# Patient Record
Sex: MALE | Race: BLACK OR AFRICAN AMERICAN | Employment: FULL TIME | URBAN - METROPOLITAN AREA
[De-identification: names, ages, dates, MRNs, and addresses within clinical notes are randomized per-mention and may not be internally consistent; named-entity substitution may affect disease eponyms.]

---

## 2017-03-21 ENCOUNTER — APPOINTMENT (OUTPATIENT)
Dept: LAB | Age: 47
End: 2017-03-21
Attending: FAMILY MEDICINE
Payer: COMMERCIAL

## 2017-03-21 ENCOUNTER — OFFICE VISIT (OUTPATIENT)
Dept: FAMILY MEDICINE CLINIC | Facility: CLINIC | Age: 47
End: 2017-03-21

## 2017-03-21 VITALS
WEIGHT: 186 LBS | DIASTOLIC BLOOD PRESSURE: 98 MMHG | HEART RATE: 59 BPM | HEIGHT: 72 IN | TEMPERATURE: 98 F | RESPIRATION RATE: 16 BRPM | SYSTOLIC BLOOD PRESSURE: 122 MMHG | BODY MASS INDEX: 25.19 KG/M2

## 2017-03-21 DIAGNOSIS — E78.5 HYPERLIPIDEMIA, UNSPECIFIED HYPERLIPIDEMIA TYPE: Primary | ICD-10-CM

## 2017-03-21 DIAGNOSIS — E78.5 HYPERLIPIDEMIA, UNSPECIFIED HYPERLIPIDEMIA TYPE: ICD-10-CM

## 2017-03-21 LAB
CHOLEST SERPL-MCNC: 239 MG/DL (ref 110–200)
HDLC SERPL-MCNC: 49 MG/DL
LDLC SERPL CALC-MCNC: 177 MG/DL (ref 0–99)
NONHDLC SERPL-MCNC: 190 MG/DL
TRIGL SERPL-MCNC: 65 MG/DL (ref 1–149)

## 2017-03-21 PROCEDURE — 99214 OFFICE O/P EST MOD 30 MIN: CPT | Performed by: FAMILY MEDICINE

## 2017-03-21 PROCEDURE — 36415 COLL VENOUS BLD VENIPUNCTURE: CPT

## 2017-03-21 PROCEDURE — 80061 LIPID PANEL: CPT

## 2017-03-21 PROCEDURE — 99212 OFFICE O/P EST SF 10 MIN: CPT | Performed by: FAMILY MEDICINE

## 2017-03-21 NOTE — PROGRESS NOTES
Fatoumata Fair is a 41year old male who presents for routine physical. 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 He says he has made some improvements in diet. Patient sleeps really well. Patient only takes vitamins.  Last lipid panel and blood glucose was bor ear canals clear. Yves TMs intact with good landmarks noted. Nares patent. Oral mucous membrane moist.  Normal lips, teeth, and gums. Oropharynx normal.  Neck supple. Good ROM. No LAD.   Thyroid normal.  Lungs:  Clear to ausculation; good aeration

## 2017-03-21 NOTE — PATIENT INSTRUCTIONS
Monitor blood pressures and record at home. Limit salt intake. Consider initiating blood pressure therapy. Lipid recheck. Recommend weight loss via daily exercising and consistent healthy dietary changes.

## 2017-05-01 ENCOUNTER — OFFICE VISIT (OUTPATIENT)
Dept: FAMILY MEDICINE CLINIC | Facility: CLINIC | Age: 47
End: 2017-05-01

## 2017-05-01 VITALS
HEIGHT: 72 IN | SYSTOLIC BLOOD PRESSURE: 148 MMHG | HEART RATE: 82 BPM | DIASTOLIC BLOOD PRESSURE: 94 MMHG | RESPIRATION RATE: 16 BRPM | BODY MASS INDEX: 25.19 KG/M2 | WEIGHT: 186 LBS | TEMPERATURE: 99 F

## 2017-05-01 DIAGNOSIS — Z80.0 FAMILY HISTORY OF COLON CANCER: ICD-10-CM

## 2017-05-01 DIAGNOSIS — I10 ESSENTIAL HYPERTENSION: ICD-10-CM

## 2017-05-01 DIAGNOSIS — E78.5 HYPERLIPIDEMIA, UNSPECIFIED HYPERLIPIDEMIA TYPE: Primary | ICD-10-CM

## 2017-05-01 PROCEDURE — 99214 OFFICE O/P EST MOD 30 MIN: CPT | Performed by: FAMILY MEDICINE

## 2017-05-01 PROCEDURE — 99212 OFFICE O/P EST SF 10 MIN: CPT | Performed by: FAMILY MEDICINE

## 2017-05-01 NOTE — PATIENT INSTRUCTIONS
Monitor blood pressures and record at home. Limit salt intake. Recommend daily exercising and consistent healthy dietary changes. Colonoscopy recommended secondary to 1st degree family member with colon CA (father).

## 2017-05-01 NOTE — PROGRESS NOTES
HPI:    Patient ID: Inga Goins is a 41year old male. Blood Pressure  This is a chronic problem. The current episode started more than 1 month ago. The problem occurs intermittently. The problem has been waxing and waning.  Pertinent negatives include Normal rate and regular rhythm. Exam reveals no gallop. Edema not present. Carotid bruit not present. Pulmonary/Chest: Effort normal and breath sounds normal. No respiratory distress. ASSESSMENT/PLAN:   1.  Hyperlipidemia, unspecified hype

## 2017-06-29 ENCOUNTER — OFFICE VISIT (OUTPATIENT)
Dept: GASTROENTEROLOGY | Facility: CLINIC | Age: 47
End: 2017-06-29

## 2017-06-29 ENCOUNTER — TELEPHONE (OUTPATIENT)
Dept: GASTROENTEROLOGY | Facility: CLINIC | Age: 47
End: 2017-06-29

## 2017-06-29 VITALS
BODY MASS INDEX: 25.57 KG/M2 | SYSTOLIC BLOOD PRESSURE: 124 MMHG | HEIGHT: 72 IN | DIASTOLIC BLOOD PRESSURE: 90 MMHG | WEIGHT: 188.81 LBS

## 2017-06-29 DIAGNOSIS — Z80.0 FAMILY HISTORY OF GI MALIGNANCY: Primary | ICD-10-CM

## 2017-06-29 DIAGNOSIS — Z12.11 COLON CANCER SCREENING: ICD-10-CM

## 2017-06-29 PROCEDURE — 99212 OFFICE O/P EST SF 10 MIN: CPT | Performed by: INTERNAL MEDICINE

## 2017-06-29 PROCEDURE — 99243 OFF/OP CNSLTJ NEW/EST LOW 30: CPT | Performed by: INTERNAL MEDICINE

## 2017-06-29 NOTE — TELEPHONE ENCOUNTER
Scheduled for:  Colon/EGD  Provider Name: Dr. Raven Montez  Date:  10-28-17  Location:  [facility]  Sedation:  IV sedation   Time:  8:30am, arrival 7:30am  Prep: Suprep  Meds/Allergies Reconciled?:  Yes   Diagnosis with codes:  Screening Z12.11, family history of Ruth Ann Pair

## 2017-07-01 NOTE — PROGRESS NOTES
Raven Branham is a 41year old male. HPI:   Patient presents with:  Colonoscopy Screening: No prior colon. FH stomach cancer.  No current complaints       The patient is a 30-year-old male who is a family history of GI malignancy, his father was diagnose no wheezing,  Heart- regular rate, no murmur or gallop  Abdomen- Soft and nontender, nondistended, no scars, no organosplenomegly  Ext- no clubbing or cyanosis  Skin- no rashes or lesions  Neuro- appropriate response, alert, no confusion  .   ASSESSMENT/BUDDY

## 2017-08-09 ENCOUNTER — OFFICE VISIT (OUTPATIENT)
Dept: FAMILY MEDICINE CLINIC | Facility: CLINIC | Age: 47
End: 2017-08-09

## 2017-08-09 ENCOUNTER — APPOINTMENT (OUTPATIENT)
Dept: LAB | Age: 47
End: 2017-08-09
Attending: FAMILY MEDICINE
Payer: COMMERCIAL

## 2017-08-09 ENCOUNTER — LAB ENCOUNTER (OUTPATIENT)
Dept: LAB | Age: 47
End: 2017-08-09
Attending: FAMILY MEDICINE
Payer: COMMERCIAL

## 2017-08-09 VITALS
HEART RATE: 93 BPM | SYSTOLIC BLOOD PRESSURE: 114 MMHG | HEIGHT: 72 IN | WEIGHT: 188 LBS | TEMPERATURE: 98 F | BODY MASS INDEX: 25.47 KG/M2 | DIASTOLIC BLOOD PRESSURE: 88 MMHG

## 2017-08-09 DIAGNOSIS — Z00.00 ROUTINE GENERAL MEDICAL EXAMINATION AT A HEALTH CARE FACILITY: Primary | ICD-10-CM

## 2017-08-09 DIAGNOSIS — Z00.00 PREVENTATIVE HEALTH CARE: ICD-10-CM

## 2017-08-09 DIAGNOSIS — E78.5 HYPERLIPIDEMIA, UNSPECIFIED HYPERLIPIDEMIA TYPE: ICD-10-CM

## 2017-08-09 DIAGNOSIS — Z00.00 PREVENTATIVE HEALTH CARE: Primary | ICD-10-CM

## 2017-08-09 DIAGNOSIS — Z12.5 PROSTATE CANCER SCREENING: ICD-10-CM

## 2017-08-09 LAB
ALBUMIN SERPL BCP-MCNC: 4.4 G/DL (ref 3.5–4.8)
ALBUMIN/GLOB SERPL: 1.3 {RATIO} (ref 1–2)
ALP SERPL-CCNC: 42 U/L (ref 32–100)
ALT SERPL-CCNC: 22 U/L (ref 17–63)
ANION GAP SERPL CALC-SCNC: 8 MMOL/L (ref 0–18)
AST SERPL-CCNC: 25 U/L (ref 15–41)
BACTERIA UR QL AUTO: NEGATIVE /HPF
BASOPHILS # BLD: 0 K/UL (ref 0–0.2)
BASOPHILS NFR BLD: 1 %
BILIRUB SERPL-MCNC: 1 MG/DL (ref 0.3–1.2)
BILIRUB UR QL: NEGATIVE
BUN SERPL-MCNC: 12 MG/DL (ref 8–20)
BUN/CREAT SERPL: 10.3 (ref 10–20)
CALCIUM SERPL-MCNC: 9.5 MG/DL (ref 8.5–10.5)
CHLORIDE SERPL-SCNC: 104 MMOL/L (ref 95–110)
CHOLEST SERPL-MCNC: 239 MG/DL (ref 110–200)
CO2 SERPL-SCNC: 25 MMOL/L (ref 22–32)
COLOR UR: YELLOW
CREAT SERPL-MCNC: 1.16 MG/DL (ref 0.5–1.5)
EOSINOPHIL # BLD: 0.1 K/UL (ref 0–0.7)
EOSINOPHIL NFR BLD: 2 %
ERYTHROCYTE [DISTWIDTH] IN BLOOD BY AUTOMATED COUNT: 13.4 % (ref 11–15)
GLOBULIN PLAS-MCNC: 3.3 G/DL (ref 2.5–3.7)
GLUCOSE SERPL-MCNC: 105 MG/DL (ref 70–99)
GLUCOSE UR-MCNC: NEGATIVE MG/DL
HCT VFR BLD AUTO: 48.4 % (ref 41–52)
HDLC SERPL-MCNC: 49 MG/DL
HGB BLD-MCNC: 16.6 G/DL (ref 13.5–17.5)
HGB UR QL STRIP.AUTO: NEGATIVE
KETONES UR-MCNC: NEGATIVE MG/DL
LDLC SERPL CALC-MCNC: 164 MG/DL (ref 0–99)
LEUKOCYTE ESTERASE UR QL STRIP.AUTO: NEGATIVE
LYMPHOCYTES # BLD: 1.8 K/UL (ref 1–4)
LYMPHOCYTES NFR BLD: 35 %
MCH RBC QN AUTO: 28.2 PG (ref 27–32)
MCHC RBC AUTO-ENTMCNC: 34.4 G/DL (ref 32–37)
MCV RBC AUTO: 82 FL (ref 80–100)
MONOCYTES # BLD: 0.5 K/UL (ref 0–1)
MONOCYTES NFR BLD: 10 %
NEUTROPHILS # BLD AUTO: 2.6 K/UL (ref 1.8–7.7)
NEUTROPHILS NFR BLD: 52 %
NITRITE UR QL STRIP.AUTO: NEGATIVE
NONHDLC SERPL-MCNC: 190 MG/DL
OSMOLALITY UR CALC.SUM OF ELEC: 284 MOSM/KG (ref 275–295)
PH UR: 5 [PH] (ref 5–8)
PLATELET # BLD AUTO: 215 K/UL (ref 140–400)
PMV BLD AUTO: 9.2 FL (ref 7.4–10.3)
POTASSIUM SERPL-SCNC: 4 MMOL/L (ref 3.3–5.1)
PROT SERPL-MCNC: 7.7 G/DL (ref 5.9–8.4)
PROT UR-MCNC: NEGATIVE MG/DL
PSA SERPL-MCNC: 1 NG/ML (ref 0–4)
RBC # BLD AUTO: 5.9 M/UL (ref 4.5–5.9)
RBC #/AREA URNS AUTO: 1 /HPF
SODIUM SERPL-SCNC: 137 MMOL/L (ref 136–144)
SP GR UR STRIP: 1.01 (ref 1–1.03)
TRIGL SERPL-MCNC: 130 MG/DL (ref 1–149)
TSH SERPL-ACNC: 0.99 UIU/ML (ref 0.45–5.33)
UROBILINOGEN UR STRIP-ACNC: <2
VIT C UR-MCNC: 40 MG/DL
WBC # BLD AUTO: 5 K/UL (ref 4–11)
WBC #/AREA URNS AUTO: <1 /HPF

## 2017-08-09 PROCEDURE — 80061 LIPID PANEL: CPT

## 2017-08-09 PROCEDURE — 93005 ELECTROCARDIOGRAM TRACING: CPT

## 2017-08-09 PROCEDURE — 84443 ASSAY THYROID STIM HORMONE: CPT

## 2017-08-09 PROCEDURE — 99213 OFFICE O/P EST LOW 20 MIN: CPT | Performed by: FAMILY MEDICINE

## 2017-08-09 PROCEDURE — 80053 COMPREHEN METABOLIC PANEL: CPT

## 2017-08-09 PROCEDURE — 99212 OFFICE O/P EST SF 10 MIN: CPT | Performed by: FAMILY MEDICINE

## 2017-08-09 PROCEDURE — 85025 COMPLETE CBC W/AUTO DIFF WBC: CPT

## 2017-08-09 PROCEDURE — 81003 URINALYSIS AUTO W/O SCOPE: CPT

## 2017-08-09 PROCEDURE — 93010 ELECTROCARDIOGRAM REPORT: CPT | Performed by: FAMILY MEDICINE

## 2017-08-09 PROCEDURE — 99396 PREV VISIT EST AGE 40-64: CPT | Performed by: FAMILY MEDICINE

## 2017-08-09 PROCEDURE — 36415 COLL VENOUS BLD VENIPUNCTURE: CPT

## 2017-08-09 PROCEDURE — 80050 GENERAL HEALTH PANEL: CPT

## 2017-08-09 NOTE — PATIENT INSTRUCTIONS
All adult screening ordered and done appropriate for patient's age and gender and risk factors and complaints. Monitor blood pressures and record at home. Limit salt intake.   Recommend weight loss via daily exercising and consistent healthy dietary change

## 2017-08-09 NOTE — PROGRESS NOTES
HPI:    Patient ID: Lilly Gomez is a 41year old male. 41year old AA male here for complete preventive care. No specific acute or chronic complaints. Does have a history of elevated lipids and needs a status update.       Hyperlipidemia   This is a ch COMPLETE  - CBC WITH DIFFERENTIAL WITH PLATELET; Future  - COMP METABOLIC PANEL (14); Future  - TSH W REFLEX TO FREE T4; Future  - URINALYSIS, ROUTINE; Future    2. Hyperlipidemia, unspecified hyperlipidemia type  Status update  - LIPID PANEL; Future    3.

## 2017-08-16 ENCOUNTER — PATIENT MESSAGE (OUTPATIENT)
Dept: FAMILY MEDICINE CLINIC | Facility: CLINIC | Age: 47
End: 2017-08-16

## 2017-08-18 NOTE — TELEPHONE ENCOUNTER
From: Monserrat Jesus  To: Keven Hernandez DO  Sent: 8/16/2017 7:59 PM CDT  Subject: Non-Urgent Medical Question    I have a question about CBC W/ DIFFERENTIAL resulted on 8/9/17, 12:48 PM.    What is my blood type

## 2017-10-28 ENCOUNTER — SURGERY (OUTPATIENT)
Age: 47
End: 2017-10-28

## 2017-10-28 ENCOUNTER — HOSPITAL ENCOUNTER (OUTPATIENT)
Facility: HOSPITAL | Age: 47
Setting detail: HOSPITAL OUTPATIENT SURGERY
Discharge: HOME | End: 2017-10-28
Attending: INTERNAL MEDICINE | Admitting: INTERNAL MEDICINE
Payer: COMMERCIAL

## 2017-10-28 DIAGNOSIS — Z12.11 SCREENING FOR MALIGNANT NEOPLASM OF COLON: ICD-10-CM

## 2017-10-28 DIAGNOSIS — Z80.0 FAMILY HISTORY OF GASTRIC CANCER: ICD-10-CM

## 2017-10-28 PROCEDURE — 0DBH8ZX EXCISION OF CECUM, VIA NATURAL OR ARTIFICIAL OPENING ENDOSCOPIC, DIAGNOSTIC: ICD-10-PCS | Performed by: INTERNAL MEDICINE

## 2017-10-28 PROCEDURE — 99152 MOD SED SAME PHYS/QHP 5/>YRS: CPT | Performed by: INTERNAL MEDICINE

## 2017-10-28 PROCEDURE — 43239 EGD BIOPSY SINGLE/MULTIPLE: CPT | Performed by: INTERNAL MEDICINE

## 2017-10-28 PROCEDURE — 0DB68ZX EXCISION OF STOMACH, VIA NATURAL OR ARTIFICIAL OPENING ENDOSCOPIC, DIAGNOSTIC: ICD-10-PCS | Performed by: INTERNAL MEDICINE

## 2017-10-28 PROCEDURE — 45385 COLONOSCOPY W/LESION REMOVAL: CPT | Performed by: INTERNAL MEDICINE

## 2017-10-28 PROCEDURE — 99153 MOD SED SAME PHYS/QHP EA: CPT | Performed by: INTERNAL MEDICINE

## 2017-10-28 RX ORDER — SODIUM CHLORIDE 0.9 % (FLUSH) 0.9 %
10 SYRINGE (ML) INJECTION AS NEEDED
Status: DISCONTINUED | OUTPATIENT
Start: 2017-10-28 | End: 2017-10-28

## 2017-10-28 RX ORDER — MIDAZOLAM HYDROCHLORIDE 1 MG/ML
1 INJECTION INTRAMUSCULAR; INTRAVENOUS EVERY 5 MIN PRN
Status: DISCONTINUED | OUTPATIENT
Start: 2017-10-28 | End: 2017-10-28

## 2017-10-28 RX ORDER — SODIUM CHLORIDE, SODIUM LACTATE, POTASSIUM CHLORIDE, CALCIUM CHLORIDE 600; 310; 30; 20 MG/100ML; MG/100ML; MG/100ML; MG/100ML
INJECTION, SOLUTION INTRAVENOUS CONTINUOUS
Status: DISCONTINUED | OUTPATIENT
Start: 2017-10-28 | End: 2017-10-28

## 2017-10-28 RX ORDER — MIDAZOLAM HYDROCHLORIDE 1 MG/ML
INJECTION INTRAMUSCULAR; INTRAVENOUS
Status: DISCONTINUED | OUTPATIENT
Start: 2017-10-28 | End: 2017-10-28

## 2017-10-28 NOTE — OPERATIVE REPORT
[facility]P - [facility] Endoscopy Report      Preoperative Diagnosis:  - colon screening  - family history of GI malignancy       Postoperative Diagnosis:  - colon polyp x 1  - diverticulum right colon  - small internal hemorrhoids  - gastritis      Pro appropriately with insufflated air. The mucosa of the stomach showed mild erythema, biopsies taken.   The duodenal bulb and post bulbar regions were normal.      Impression:  - colon polyp x 1  - diverticulum right colon  - small internal hemorrhoids  - ga

## 2017-10-28 NOTE — H&P
History & Physical Examination    Patient Name: Monserrat Jesus  MRN: [id number]  [facility]: [id number]  YOB: 1970    Diagnosis: colon screening, family history of stomach cancer        Prescriptions Prior to Admission:  Vardenafil HCl (LEVITRA) 20 MG

## 2017-10-30 VITALS
HEART RATE: 78 BPM | BODY MASS INDEX: 25.6 KG/M2 | SYSTOLIC BLOOD PRESSURE: 127 MMHG | WEIGHT: 189 LBS | HEIGHT: 72 IN | DIASTOLIC BLOOD PRESSURE: 93 MMHG | RESPIRATION RATE: 17 BRPM | OXYGEN SATURATION: 99 %

## 2017-10-30 NOTE — TELEPHONE ENCOUNTER
msg left on  for pt to call to review. [phone] Andie Chawla for RN to give results.       H pylori positive gastritis - plan treatment and meds pended    Colon polyp - repeat colonoscopy in 3 years

## 2017-11-01 ENCOUNTER — TELEPHONE (OUTPATIENT)
Dept: GASTROENTEROLOGY | Facility: CLINIC | Age: 47
End: 2017-11-01

## 2017-11-01 RX ORDER — OMEPRAZOLE 20 MG/1
20 CAPSULE, DELAYED RELEASE ORAL 2 TIMES DAILY
Qty: 20 CAPSULE | Refills: 0
Start: 2017-11-01

## 2017-11-01 RX ORDER — AMOXICILLIN 500 MG/1
1000 TABLET, FILM COATED ORAL 2 TIMES DAILY
Qty: 60 TABLET | Refills: 0 | Status: SHIPPED | OUTPATIENT
Start: 2017-11-01 | End: 2017-11-15

## 2017-11-01 RX ORDER — OMEPRAZOLE 20 MG/1
20 CAPSULE, DELAYED RELEASE ORAL 2 TIMES DAILY
Qty: 20 CAPSULE | Refills: 0 | Status: SHIPPED | OUTPATIENT
Start: 2017-11-01 | End: 2018-11-29

## 2017-11-01 RX ORDER — CLARITHROMYCIN 500 MG/1
500 TABLET, COATED ORAL 2 TIMES DAILY
Qty: 28 TABLET | Refills: 0 | Status: SHIPPED | OUTPATIENT
Start: 2017-11-01 | End: 2017-11-15

## 2017-11-01 RX ORDER — AMOXICILLIN 500 MG/1
1000 TABLET, FILM COATED ORAL 2 TIMES DAILY
Qty: 60 TABLET | Refills: 0
Start: 2017-11-01 | End: 2017-11-15

## 2017-11-01 RX ORDER — CLARITHROMYCIN 500 MG/1
500 TABLET, COATED ORAL 2 TIMES DAILY
Qty: 28 TABLET | Refills: 0
Start: 2017-11-01 | End: 2017-11-15

## 2017-11-01 NOTE — TELEPHONE ENCOUNTER
From: Ning Wolf  Sent: 11/1/2017 3:53 PM CDT  Subject: Medication Renewal Request    Ning Wolf would like a refill of the following medications:     amoxicillin 500 MG Oral Tab [Krishna Holder MD]     clarithromycin 500 MG Oral Tab [Krishna Holder MD]     omeprazole 20 MG Oral Capsule Delayed Release [Krishna Holder MD]    Preferred pharmacy: [facility] [address] [place] [address] AT [facility], [phone], [phone]    Comment:

## 2017-11-01 NOTE — TELEPHONE ENCOUNTER
----- Message from Real Medina MD sent at 10/30/2017  5:55 PM CDT -----  Colon polyp 1 cm in size, repeat colonoscopy in 3 years.      The upper scope showed gastritis with bacteria- hpylori - plan treatment, see TE

## 2017-11-01 NOTE — TELEPHONE ENCOUNTER
Dr Darío Garcia,  Pt contacted, given results. Recall entered for 10/28/2020. I explained to pt h pylori treatment, and that he would have to follow directions and complete the entire 14 days. To call us if any problems with meds.  Could you please send the pended

## 2018-02-06 ENCOUNTER — OFFICE VISIT (OUTPATIENT)
Dept: FAMILY MEDICINE CLINIC | Facility: CLINIC | Age: 48
End: 2018-02-06

## 2018-02-06 VITALS
BODY MASS INDEX: 25.06 KG/M2 | TEMPERATURE: 98 F | SYSTOLIC BLOOD PRESSURE: 129 MMHG | HEIGHT: 72 IN | RESPIRATION RATE: 16 BRPM | DIASTOLIC BLOOD PRESSURE: 87 MMHG | HEART RATE: 85 BPM | WEIGHT: 185 LBS

## 2018-02-06 DIAGNOSIS — L84 CALLUS OF FOOT: Primary | ICD-10-CM

## 2018-02-06 PROCEDURE — 99213 OFFICE O/P EST LOW 20 MIN: CPT | Performed by: FAMILY MEDICINE

## 2018-02-06 PROCEDURE — 99212 OFFICE O/P EST SF 10 MIN: CPT | Performed by: FAMILY MEDICINE

## 2018-02-09 NOTE — PROGRESS NOTES
HPI:    Patient ID: Hesham Galvan is a 57year old male. Skin   This is a new problem. The current episode started more than 1 month ago. The problem occurs constantly. The problem has been gradually worsening.  Associated symptoms comments: Pain with

## 2018-05-10 ENCOUNTER — HOSPITAL ENCOUNTER (OUTPATIENT)
Age: 48
Discharge: OTHER INSTITUTION | End: 2018-05-10
Attending: FAMILY MEDICINE
Payer: COMMERCIAL

## 2018-05-10 VITALS
TEMPERATURE: 98 F | OXYGEN SATURATION: 99 % | SYSTOLIC BLOOD PRESSURE: 127 MMHG | RESPIRATION RATE: 16 BRPM | DIASTOLIC BLOOD PRESSURE: 98 MMHG | HEART RATE: 96 BPM

## 2018-05-10 DIAGNOSIS — R07.9 CHEST PAIN, RULE OUT ACUTE MYOCARDIAL INFARCTION: Primary | ICD-10-CM

## 2018-05-10 PROCEDURE — 99214 OFFICE O/P EST MOD 30 MIN: CPT

## 2018-05-10 PROCEDURE — 99204 OFFICE O/P NEW MOD 45 MIN: CPT

## 2018-05-10 PROCEDURE — 93010 ELECTROCARDIOGRAM REPORT: CPT

## 2018-05-10 PROCEDURE — 93010 ELECTROCARDIOGRAM REPORT: CPT | Performed by: FAMILY MEDICINE

## 2018-05-10 PROCEDURE — 93005 ELECTROCARDIOGRAM TRACING: CPT

## 2018-05-10 RX ORDER — ASPIRIN 81 MG/1
324 TABLET, CHEWABLE ORAL ONCE
Status: COMPLETED | OUTPATIENT
Start: 2018-05-10 | End: 2018-05-10

## 2018-05-10 NOTE — ED INITIAL ASSESSMENT (HPI)
Patient comes in with mid epigastric chest pain which started last night. States pain does not radiate. Pain is intermittent. Pain is a burning sensation.

## 2018-05-10 NOTE — ED PROVIDER NOTES
Patient Seen in: [address] [facility] [address]    History   CC:  Patient presents with:  Chest Pain Angina (cardiovascular)    Stated Complaint: chest pain; severe heart burn    ------------------------------  Per Rn: (paraphrase)    Pranay (Room air)    Current:/98   Pulse 96   Temp 97.8 °F (36.6 °C) (Oral)   Resp 16   SpO2 99%         General Appearance:    Alert, cooperative, no distress, appears stated age   Head:    Normocephalic, without obvious abnormality, atraumatic   Eyes: provider specified.       Medications Prescribed:  Discharge Medication List as of 5/10/2018  1:12 PM

## 2018-05-10 NOTE — ED NOTES
Patient referred to ER for further evaluation. He came by himself and MD did not want him driving alone. EMS called for transport.

## 2018-08-09 ENCOUNTER — LAB ENCOUNTER (OUTPATIENT)
Dept: LAB | Age: 48
End: 2018-08-09
Attending: FAMILY MEDICINE
Payer: COMMERCIAL

## 2018-08-09 ENCOUNTER — OFFICE VISIT (OUTPATIENT)
Dept: FAMILY MEDICINE CLINIC | Facility: CLINIC | Age: 48
End: 2018-08-09
Payer: COMMERCIAL

## 2018-08-09 VITALS
BODY MASS INDEX: 25.87 KG/M2 | HEART RATE: 70 BPM | RESPIRATION RATE: 16 BRPM | HEIGHT: 72 IN | DIASTOLIC BLOOD PRESSURE: 98 MMHG | TEMPERATURE: 97 F | WEIGHT: 191 LBS | SYSTOLIC BLOOD PRESSURE: 142 MMHG

## 2018-08-09 DIAGNOSIS — Z11.3 SCREEN FOR STD (SEXUALLY TRANSMITTED DISEASE): ICD-10-CM

## 2018-08-09 DIAGNOSIS — Z12.5 PROSTATE CANCER SCREENING: ICD-10-CM

## 2018-08-09 DIAGNOSIS — Z00.00 WELL ADULT ON ROUTINE HEALTH CHECK: Primary | ICD-10-CM

## 2018-08-09 DIAGNOSIS — Z13.220 LIPID SCREENING: ICD-10-CM

## 2018-08-09 DIAGNOSIS — Z00.00 WELL ADULT ON ROUTINE HEALTH CHECK: ICD-10-CM

## 2018-08-09 DIAGNOSIS — Z13.29 THYROID DISORDER SCREEN: ICD-10-CM

## 2018-08-09 LAB
ALBUMIN SERPL BCP-MCNC: 4.6 G/DL (ref 3.5–4.8)
ALBUMIN/GLOB SERPL: 1.3 {RATIO} (ref 1–2)
ALP SERPL-CCNC: 44 U/L (ref 32–100)
ALT SERPL-CCNC: 25 U/L (ref 17–63)
ANION GAP SERPL CALC-SCNC: 10 MMOL/L (ref 0–18)
AST SERPL-CCNC: 28 U/L (ref 15–41)
BACTERIA UR QL AUTO: NEGATIVE /HPF
BILIRUB SERPL-MCNC: 0.8 MG/DL (ref 0.3–1.2)
BILIRUB UR QL: NEGATIVE
BUN SERPL-MCNC: 13 MG/DL (ref 8–20)
BUN/CREAT SERPL: 12.9 (ref 10–20)
CALCIUM SERPL-MCNC: 9.8 MG/DL (ref 8.5–10.5)
CHLORIDE SERPL-SCNC: 101 MMOL/L (ref 95–110)
CHOLEST SERPL-MCNC: 252 MG/DL (ref 110–200)
CLARITY UR: CLEAR
CO2 SERPL-SCNC: 26 MMOL/L (ref 22–32)
COLOR UR: YELLOW
CREAT SERPL-MCNC: 1.01 MG/DL (ref 0.5–1.5)
GLOBULIN PLAS-MCNC: 3.5 G/DL (ref 2.5–3.7)
GLUCOSE SERPL-MCNC: 95 MG/DL (ref 70–99)
GLUCOSE UR-MCNC: NEGATIVE MG/DL
HDLC SERPL-MCNC: 46 MG/DL
KETONES UR-MCNC: NEGATIVE MG/DL
LDLC SERPL CALC-MCNC: 155 MG/DL (ref 0–99)
LEUKOCYTE ESTERASE UR QL STRIP.AUTO: NEGATIVE
NITRITE UR QL STRIP.AUTO: NEGATIVE
NONHDLC SERPL-MCNC: 206 MG/DL
OSMOLALITY UR CALC.SUM OF ELEC: 284 MOSM/KG (ref 275–295)
PATIENT FASTING: YES
PH UR: 5 [PH] (ref 5–8)
POTASSIUM SERPL-SCNC: 4.4 MMOL/L (ref 3.3–5.1)
PROT SERPL-MCNC: 8.1 G/DL (ref 5.9–8.4)
PROT UR-MCNC: NEGATIVE MG/DL
PSA SERPL-MCNC: 1 NG/ML (ref 0–4)
RBC #/AREA URNS AUTO: 0 /HPF
SODIUM SERPL-SCNC: 137 MMOL/L (ref 136–144)
SP GR UR STRIP: 1.01 (ref 1–1.03)
TRIGL SERPL-MCNC: 255 MG/DL (ref 1–149)
TSH SERPL-ACNC: 2.38 UIU/ML (ref 0.45–5.33)
UROBILINOGEN UR STRIP-ACNC: <2
VIT C UR-MCNC: NEGATIVE MG/DL
WBC #/AREA URNS AUTO: <1 /HPF

## 2018-08-09 PROCEDURE — 86803 HEPATITIS C AB TEST: CPT

## 2018-08-09 PROCEDURE — 85025 COMPLETE CBC W/AUTO DIFF WBC: CPT

## 2018-08-09 PROCEDURE — 80061 LIPID PANEL: CPT

## 2018-08-09 PROCEDURE — 86708 HEPATITIS A ANTIBODY: CPT

## 2018-08-09 PROCEDURE — 93000 ELECTROCARDIOGRAM COMPLETE: CPT | Performed by: FAMILY MEDICINE

## 2018-08-09 PROCEDURE — 99396 PREV VISIT EST AGE 40-64: CPT | Performed by: FAMILY MEDICINE

## 2018-08-09 PROCEDURE — 80053 COMPREHEN METABOLIC PANEL: CPT

## 2018-08-09 PROCEDURE — 81001 URINALYSIS AUTO W/SCOPE: CPT

## 2018-08-09 PROCEDURE — 87340 HEPATITIS B SURFACE AG IA: CPT

## 2018-08-09 PROCEDURE — 85060 BLOOD SMEAR INTERPRETATION: CPT

## 2018-08-09 PROCEDURE — 86706 HEP B SURFACE ANTIBODY: CPT

## 2018-08-09 PROCEDURE — 36415 COLL VENOUS BLD VENIPUNCTURE: CPT

## 2018-08-09 PROCEDURE — 86780 TREPONEMA PALLIDUM: CPT

## 2018-08-09 PROCEDURE — 87389 HIV-1 AG W/HIV-1&-2 AB AG IA: CPT

## 2018-08-09 PROCEDURE — 80500 HEPATITIS A B + C PROFILE: CPT

## 2018-08-09 PROCEDURE — 93005 ELECTROCARDIOGRAM TRACING: CPT | Performed by: FAMILY MEDICINE

## 2018-08-09 PROCEDURE — 84443 ASSAY THYROID STIM HORMONE: CPT

## 2018-08-09 PROCEDURE — 86704 HEP B CORE ANTIBODY TOTAL: CPT

## 2018-08-09 NOTE — PATIENT INSTRUCTIONS
All adult screening ordered and done appropriate for patient's age and gender and risk factors and complaints. Encouraged physical fitness and daily physical activity daily (PLAY). Monitor blood pressures and record at home. Limit salt intake.   Medicatio

## 2018-08-09 NOTE — PROGRESS NOTES
HPI:    Patient ID: Dennie Forte is a 57year old male.     57year old AA male here for complete preventive care physical and for status update on any confirmed chronic medical illnesses and follow up on any previous labs or procedures that were suggest HEPATITIS A B + C PROFILE; Future  - T PALLIDUM SCREENING CASCADE; Future    3. Lipid screening  Screened  - LIPID PANEL; Future    4. Thyroid disorder screen  Screened  - TSH W REFLEX TO FREE T4; Future    5.  Prostate cancer screening  Screened  - PSA SCR

## 2018-08-10 LAB
BASOPHILS # BLD: 0.1 K/UL (ref 0–0.2)
BASOPHILS NFR BLD: 1 %
EOSINOPHIL # BLD: 0.1 K/UL (ref 0–0.7)
EOSINOPHIL NFR BLD: 1 %
ERYTHROCYTE [DISTWIDTH] IN BLOOD BY AUTOMATED COUNT: 13.8 % (ref 11–15)
HAV AB SER QL IA: NONREACTIVE
HBV CORE AB SERPL QL IA: NONREACTIVE
HBV SURFACE AB SER-ACNC: <3.1 MIU/ML (ref ?–10)
HBV SURFACE AG SERPL QL IA: NONREACTIVE
HBV SURFACE AG SERPL QL IA: NONREACTIVE
HCT VFR BLD AUTO: 53.4 % (ref 41–52)
HCV AB SERPL QL IA: NONREACTIVE
HGB BLD-MCNC: 17.8 G/DL (ref 13.5–17.5)
HIV1+2 AB SERPL QL IA: NONREACTIVE
LYMPHOCYTES # BLD: 2.1 K/UL (ref 1–4)
LYMPHOCYTES NFR BLD: 41 %
MCH RBC QN AUTO: 27.8 PG (ref 27–32)
MCHC RBC AUTO-ENTMCNC: 33.3 G/DL (ref 32–37)
MCV RBC AUTO: 83.6 FL (ref 80–100)
MONOCYTES # BLD: 0.4 K/UL (ref 0–1)
MONOCYTES NFR BLD: 7 %
NEUTROPHILS # BLD AUTO: 2.6 K/UL (ref 1.8–7.7)
NEUTROPHILS NFR BLD: 50 %
RBC # BLD AUTO: 6.39 M/UL (ref 4.5–5.9)
T PALLIDUM AB SER QL: NEGATIVE
WBC # BLD AUTO: 5.2 K/UL (ref 4–11)

## 2018-10-18 ENCOUNTER — PATIENT OUTREACH (OUTPATIENT)
Dept: CASE MANAGEMENT | Age: 48
End: 2018-10-18

## 2018-10-18 NOTE — PROGRESS NOTES
Outreach to patient in regards to scheduling his HTN F/U appt. Left message to call back ext. [phone]. Thank you.

## 2018-11-29 ENCOUNTER — OFFICE VISIT (OUTPATIENT)
Dept: FAMILY MEDICINE CLINIC | Facility: CLINIC | Age: 48
End: 2018-11-29
Payer: COMMERCIAL

## 2018-11-29 VITALS
TEMPERATURE: 99 F | HEIGHT: 71.5 IN | SYSTOLIC BLOOD PRESSURE: 140 MMHG | WEIGHT: 191 LBS | BODY MASS INDEX: 26.16 KG/M2 | DIASTOLIC BLOOD PRESSURE: 100 MMHG | HEART RATE: 73 BPM

## 2018-11-29 DIAGNOSIS — I10 ESSENTIAL HYPERTENSION: Primary | ICD-10-CM

## 2018-11-29 PROCEDURE — 99212 OFFICE O/P EST SF 10 MIN: CPT | Performed by: FAMILY MEDICINE

## 2018-11-29 PROCEDURE — 99213 OFFICE O/P EST LOW 20 MIN: CPT | Performed by: FAMILY MEDICINE

## 2018-11-29 NOTE — PATIENT INSTRUCTIONS
Medication reviewed and renewed where needed and appropriate. Comply with medications. Monitor blood pressures and record at home. Limit salt intake. Echocardiogram recommended. Consider sleep study.

## 2018-11-29 NOTE — PROGRESS NOTES
HPI:    Patient ID: Dennie Forte is a 57year old male. Hypertension   This is a chronic problem. The current episode started more than 1 month ago. The problem has been waxing and waning since onset. Condition status: not consistently to control.  Pe this encounter       Imaging & Referrals:  CARD ECHO 2D DOPPLER CONTRAST (CPT=93306)  Patient Instructions   Medication reviewed and renewed where needed and appropriate. Comply with medications. Monitor blood pressures and record at home.  Limit salt int

## 2018-12-12 ENCOUNTER — HOSPITAL ENCOUNTER (OUTPATIENT)
Dept: CV DIAGNOSTICS | Facility: HOSPITAL | Age: 48
Discharge: HOME | End: 2018-12-12
Attending: FAMILY MEDICINE
Payer: COMMERCIAL

## 2018-12-12 DIAGNOSIS — I10 ESSENTIAL HYPERTENSION: ICD-10-CM

## 2018-12-12 PROCEDURE — 93306 TTE W/DOPPLER COMPLETE: CPT | Performed by: FAMILY MEDICINE

## 2019-01-08 ENCOUNTER — OFFICE VISIT (OUTPATIENT)
Dept: FAMILY MEDICINE CLINIC | Facility: CLINIC | Age: 49
End: 2019-01-08
Payer: COMMERCIAL

## 2019-01-08 VITALS
DIASTOLIC BLOOD PRESSURE: 87 MMHG | WEIGHT: 191 LBS | RESPIRATION RATE: 17 BRPM | HEART RATE: 88 BPM | SYSTOLIC BLOOD PRESSURE: 131 MMHG | HEIGHT: 71.5 IN | BODY MASS INDEX: 26.16 KG/M2

## 2019-01-08 DIAGNOSIS — I10 ESSENTIAL HYPERTENSION: Primary | ICD-10-CM

## 2019-01-08 PROCEDURE — 99212 OFFICE O/P EST SF 10 MIN: CPT | Performed by: FAMILY MEDICINE

## 2019-01-08 PROCEDURE — 99213 OFFICE O/P EST LOW 20 MIN: CPT | Performed by: FAMILY MEDICINE

## 2019-01-08 NOTE — PATIENT INSTRUCTIONS
Medication reviewed and renewed where needed and appropriate. Monitor blood pressures and record at home. Limit salt intake. Comply with medications.

## 2019-01-08 NOTE — PROGRESS NOTES
HPI:    Patient ID: Racheal Jennings is a 57year old male. Hypertension   This is a chronic problem. The current episode started more than 1 year ago. Condition status: just @ goal today.  Pertinent negatives include no chest pain, headaches, orthopnea, salt intake. Comply with medications. Return in about 3 months (around 4/8/2019), or if symptoms worsen or fail to improve.          UX#[id number]

## 2019-08-19 ENCOUNTER — APPOINTMENT (OUTPATIENT)
Dept: LAB | Age: 49
End: 2019-08-19
Attending: FAMILY MEDICINE
Payer: COMMERCIAL

## 2019-08-19 ENCOUNTER — OFFICE VISIT (OUTPATIENT)
Dept: FAMILY MEDICINE CLINIC | Facility: CLINIC | Age: 49
End: 2019-08-19
Payer: COMMERCIAL

## 2019-08-19 VITALS
RESPIRATION RATE: 17 BRPM | BODY MASS INDEX: 27.11 KG/M2 | DIASTOLIC BLOOD PRESSURE: 108 MMHG | HEIGHT: 71.5 IN | TEMPERATURE: 98 F | WEIGHT: 198 LBS | HEART RATE: 73 BPM | SYSTOLIC BLOOD PRESSURE: 140 MMHG

## 2019-08-19 DIAGNOSIS — Z13.220 LIPID SCREENING: ICD-10-CM

## 2019-08-19 DIAGNOSIS — Z00.00 ROUTINE PHYSICAL EXAMINATION: ICD-10-CM

## 2019-08-19 DIAGNOSIS — Z13.29 THYROID DISORDER SCREEN: ICD-10-CM

## 2019-08-19 DIAGNOSIS — I10 ESSENTIAL HYPERTENSION: Primary | ICD-10-CM

## 2019-08-19 DIAGNOSIS — Z11.3 ROUTINE SCREENING FOR STI (SEXUALLY TRANSMITTED INFECTION): ICD-10-CM

## 2019-08-19 DIAGNOSIS — Z12.5 PROSTATE CANCER SCREENING: ICD-10-CM

## 2019-08-19 PROCEDURE — 93000 ELECTROCARDIOGRAM COMPLETE: CPT | Performed by: FAMILY MEDICINE

## 2019-08-19 PROCEDURE — 99396 PREV VISIT EST AGE 40-64: CPT | Performed by: FAMILY MEDICINE

## 2019-08-19 NOTE — PATIENT INSTRUCTIONS
All adult screening ordered and done appropriate for patient's age and gender and risk factors and complaints. Monitor blood pressures and record at home. Limit salt intake. Encouraged physical fitness and daily physical activity daily (PLAY).   Patient m

## 2019-08-19 NOTE — PROGRESS NOTES
HPI:    Patient ID: Beatrice Tabares is a 54year old male.     54 year AA male here for complete preventive care physical and for status update on any confirmed chronic medical illnesses and follow up on any previous labs or procedures that were suggestive (14); Future  - CBC WITH DIFFERENTIAL WITH PLATELET; Future  - URINALYSIS, ROUTINE; Future    3. Prostate cancer screening  Screened  - PSA SCREEN; Future    4. Thyroid disorder screen  Screened  - TSH W REFLEX TO FREE T4; Future    5.  Lipid screening  Scr

## 2019-11-19 ENCOUNTER — OFFICE VISIT (OUTPATIENT)
Dept: FAMILY MEDICINE CLINIC | Facility: CLINIC | Age: 49
End: 2019-11-19
Payer: COMMERCIAL

## 2019-11-19 VITALS
HEART RATE: 111 BPM | WEIGHT: 198 LBS | HEIGHT: 71.5 IN | SYSTOLIC BLOOD PRESSURE: 129 MMHG | DIASTOLIC BLOOD PRESSURE: 94 MMHG | BODY MASS INDEX: 27.11 KG/M2 | RESPIRATION RATE: 17 BRPM

## 2019-11-19 DIAGNOSIS — M76.60 ACHILLES TENDON PAIN: ICD-10-CM

## 2019-11-19 DIAGNOSIS — M25.571 CHRONIC PAIN OF RIGHT ANKLE: ICD-10-CM

## 2019-11-19 DIAGNOSIS — M79.671 RIGHT FOOT PAIN: Primary | ICD-10-CM

## 2019-11-19 DIAGNOSIS — G89.29 CHRONIC PAIN OF RIGHT ANKLE: ICD-10-CM

## 2019-11-19 PROCEDURE — 99214 OFFICE O/P EST MOD 30 MIN: CPT | Performed by: FAMILY MEDICINE

## 2019-11-19 RX ORDER — METHYLPREDNISOLONE 4 MG/1
TABLET ORAL
Qty: 1 KIT | Refills: 0 | Status: SHIPPED | OUTPATIENT
Start: 2019-11-19 | End: 2020-02-06 | Stop reason: ALTCHOICE

## 2019-11-19 NOTE — PROGRESS NOTES
HPI:    Patient ID: Racheal Jennings is a 54year old male. This is a 57-year-old -American male who presents to the clinic with a persistent right ankle discomfort which was initiated while on vacation in August 2019.   Patient was engaged in danc No orders of the defined types were placed in this encounter.       Meds This Visit:  Requested Prescriptions      No prescriptions requested or ordered in this encounter       Imaging & Referrals:  None  Patient Instructions   Pain management via prescript

## 2020-02-06 ENCOUNTER — OFFICE VISIT (OUTPATIENT)
Dept: FAMILY MEDICINE CLINIC | Facility: CLINIC | Age: 50
End: 2020-02-06
Payer: COMMERCIAL

## 2020-02-06 VITALS
WEIGHT: 198 LBS | HEIGHT: 71.5 IN | HEART RATE: 92 BPM | BODY MASS INDEX: 27.11 KG/M2 | RESPIRATION RATE: 17 BRPM | SYSTOLIC BLOOD PRESSURE: 120 MMHG | DIASTOLIC BLOOD PRESSURE: 92 MMHG

## 2020-02-06 DIAGNOSIS — M21.42 FALLEN ARCHES: Primary | ICD-10-CM

## 2020-02-06 DIAGNOSIS — M79.671 CHRONIC PAIN IN RIGHT FOOT: ICD-10-CM

## 2020-02-06 DIAGNOSIS — M21.41 FALLEN ARCHES: Primary | ICD-10-CM

## 2020-02-06 DIAGNOSIS — G89.29 CHRONIC PAIN IN RIGHT FOOT: ICD-10-CM

## 2020-02-06 PROCEDURE — 99214 OFFICE O/P EST MOD 30 MIN: CPT | Performed by: FAMILY MEDICINE

## 2020-02-06 NOTE — PATIENT INSTRUCTIONS
Patient referred to podiatry. Any and all x-rays should be recommended by the podiatry specialist.  Medication reviewed and renewed where needed and appropriate. Monitor blood pressures and record at home. Limit salt intake.

## 2020-02-06 NOTE — PROGRESS NOTES
HPI:    Patient ID: Kenny Encinas is a 52year old male. This is a follow up for this 80-year-old -American male who presents to the clinic with a persistent right ankle discomfort which was initiated while on vacation in August 2019.   Patient Chronic pain in right foot  See #1. No orders of the defined types were placed in this encounter.       Meds This Visit:  Requested Prescriptions      No prescriptions requested or ordered in this encounter       Imaging & Referrals:  None  Patient Instr

## 2020-03-03 ENCOUNTER — OFFICE VISIT (OUTPATIENT)
Dept: PODIATRY CLINIC | Facility: CLINIC | Age: 50
End: 2020-03-03
Payer: COMMERCIAL

## 2020-03-03 DIAGNOSIS — M77.9 TENDONITIS: Primary | ICD-10-CM

## 2020-03-03 PROCEDURE — 99243 OFF/OP CNSLTJ NEW/EST LOW 30: CPT | Performed by: PODIATRIST

## 2020-03-03 PROCEDURE — L3060 FOOT ARCH SUPP LONGITUD/META: HCPCS | Performed by: PODIATRIST

## 2020-03-03 NOTE — PROGRESS NOTES
HPI:    Patient ID: Denton Dandy is a 52year old male. This 71-year-old male presents as a new patient to me on consult from [address] [place] [address]. Patient's chief complaint is right foot and ankle pain.   Patient states that he had an ankle sprain approximatel a.m. and 1 tablet p.m. with meals. I reviewed the use of the medication as an anti-inflammatory, concerns, and expectations. He was instructed to ice this area twice every evening for 15 to 20 minutes.   Patient is well-informed and indicates an Kin[place]

## 2020-07-29 ENCOUNTER — TELEPHONE (OUTPATIENT)
Dept: FAMILY MEDICINE CLINIC | Facility: CLINIC | Age: 50
End: 2020-07-29

## 2020-07-29 NOTE — TELEPHONE ENCOUNTER
Per wife of patient he is going to [place] tomorrow and would like to make it sure only that he is totally okay and would like to know if doctor can issue an Order for Covid testing, per patient he is totally okay no symptom.

## 2020-07-30 NOTE — TELEPHONE ENCOUNTER
If the patient is leaving on Thursday July 30, 2020, I am not sure but I think it is too late for him to get tested especially if he is getting on an airplane. I do not think the turnaround time is fast enough.   Also I am not sure if our facility does hoda

## 2020-08-17 ENCOUNTER — TELEPHONE (OUTPATIENT)
Dept: GASTROENTEROLOGY | Facility: CLINIC | Age: 50
End: 2020-08-17

## 2020-08-17 NOTE — TELEPHONE ENCOUNTER
----- Message from Marily Morrow RN sent at 11/1/2017  3:16 PM CDT -----  Regarding: colonoscopy recall  Colonoscopy recall for 3 yrs with Dr Filomena Kelly 10/28/2020.

## 2020-08-18 ENCOUNTER — TELEPHONE (OUTPATIENT)
Dept: FAMILY MEDICINE CLINIC | Facility: CLINIC | Age: 50
End: 2020-08-18

## 2020-08-18 DIAGNOSIS — Z11.3 ROUTINE SCREENING FOR STI (SEXUALLY TRANSMITTED INFECTION): Primary | ICD-10-CM

## 2020-08-20 ENCOUNTER — LAB ENCOUNTER (OUTPATIENT)
Dept: LAB | Age: 50
End: 2020-08-20
Attending: FAMILY MEDICINE
Payer: COMMERCIAL

## 2020-08-20 DIAGNOSIS — R73.9 ELEVATED BLOOD SUGAR: ICD-10-CM

## 2020-08-20 DIAGNOSIS — Z11.3 ROUTINE SCREENING FOR STI (SEXUALLY TRANSMITTED INFECTION): ICD-10-CM

## 2020-08-20 LAB
EST. AVERAGE GLUCOSE BLD GHB EST-MCNC: 100 MG/DL (ref 68–126)
HAV AB SER QL IA: NONREACTIVE
HBA1C MFR BLD HPLC: 5.1 % (ref ?–5.7)
HBV CORE AB SERPL QL IA: NONREACTIVE
HBV SURFACE AB SER QL: NONREACTIVE
HBV SURFACE AB SERPL IA-ACNC: <3.1 MIU/ML
HBV SURFACE AG SERPL QL IA: NONREACTIVE
HCV AB SERPL QL IA: NONREACTIVE

## 2020-08-20 PROCEDURE — 86780 TREPONEMA PALLIDUM: CPT

## 2020-08-20 PROCEDURE — 87389 HIV-1 AG W/HIV-1&-2 AB AG IA: CPT

## 2020-08-20 PROCEDURE — 36415 COLL VENOUS BLD VENIPUNCTURE: CPT

## 2020-08-20 PROCEDURE — 86803 HEPATITIS C AB TEST: CPT

## 2020-08-20 PROCEDURE — 87340 HEPATITIS B SURFACE AG IA: CPT

## 2020-08-20 PROCEDURE — 86708 HEPATITIS A ANTIBODY: CPT

## 2020-08-20 PROCEDURE — 87491 CHLMYD TRACH DNA AMP PROBE: CPT

## 2020-08-20 PROCEDURE — 86704 HEP B CORE ANTIBODY TOTAL: CPT

## 2020-08-20 PROCEDURE — 83036 HEMOGLOBIN GLYCOSYLATED A1C: CPT

## 2020-08-20 PROCEDURE — 80500 HEPATITIS A B + C PROFILE: CPT

## 2020-08-20 PROCEDURE — 86706 HEP B SURFACE ANTIBODY: CPT

## 2020-08-20 PROCEDURE — 87591 N.GONORRHOEAE DNA AMP PROB: CPT

## 2020-08-21 LAB
C TRACH DNA SPEC QL NAA+PROBE: NEGATIVE
N GONORRHOEA DNA SPEC QL NAA+PROBE: NEGATIVE
T PALLIDUM AB SER QL: NEGATIVE

## 2020-09-17 ENCOUNTER — OFFICE VISIT (OUTPATIENT)
Dept: FAMILY MEDICINE CLINIC | Facility: CLINIC | Age: 50
End: 2020-09-17
Payer: COMMERCIAL

## 2020-09-17 VITALS
RESPIRATION RATE: 17 BRPM | DIASTOLIC BLOOD PRESSURE: 105 MMHG | SYSTOLIC BLOOD PRESSURE: 138 MMHG | WEIGHT: 200 LBS | BODY MASS INDEX: 27.39 KG/M2 | TEMPERATURE: 98 F | HEIGHT: 71.5 IN | HEART RATE: 83 BPM

## 2020-09-17 DIAGNOSIS — I10 ESSENTIAL HYPERTENSION: ICD-10-CM

## 2020-09-17 DIAGNOSIS — Z12.5 PROSTATE CANCER SCREENING: ICD-10-CM

## 2020-09-17 DIAGNOSIS — Z00.00 ROUTINE PHYSICAL EXAMINATION: Primary | ICD-10-CM

## 2020-09-17 DIAGNOSIS — F43.9 STRESS: ICD-10-CM

## 2020-09-17 LAB
ALBUMIN SERPL-MCNC: 4.1 G/DL (ref 3.4–5)
ALBUMIN/GLOB SERPL: 1.1 {RATIO} (ref 1–2)
ALP LIVER SERPL-CCNC: 49 U/L (ref 45–117)
ALT SERPL-CCNC: 31 U/L (ref 16–61)
ANION GAP SERPL CALC-SCNC: 6 MMOL/L (ref 0–18)
AST SERPL-CCNC: 22 U/L (ref 15–37)
BACTERIA UR QL AUTO: NEGATIVE /HPF
BASOPHILS # BLD AUTO: 0.03 X10(3) UL (ref 0–0.2)
BASOPHILS NFR BLD AUTO: 0.6 %
BILIRUB SERPL-MCNC: 0.5 MG/DL (ref 0.1–2)
BILIRUB UR QL: NEGATIVE
BUN BLD-MCNC: 16 MG/DL (ref 7–18)
BUN/CREAT SERPL: 11.9 (ref 10–20)
CALCIUM BLD-MCNC: 9.3 MG/DL (ref 8.5–10.1)
CHLORIDE SERPL-SCNC: 107 MMOL/L (ref 98–112)
CHOLEST SMN-MCNC: 229 MG/DL (ref ?–200)
CLARITY UR: CLEAR
CO2 SERPL-SCNC: 26 MMOL/L (ref 21–32)
COLOR UR: YELLOW
COMPLEXED PSA SERPL-MCNC: 1.59 NG/ML (ref ?–4)
CREAT BLD-MCNC: 1.34 MG/DL (ref 0.7–1.3)
DEPRECATED RDW RBC AUTO: 39.7 FL (ref 35.1–46.3)
EOSINOPHIL # BLD AUTO: 0.08 X10(3) UL (ref 0–0.7)
EOSINOPHIL NFR BLD AUTO: 1.6 %
ERYTHROCYTE [DISTWIDTH] IN BLOOD BY AUTOMATED COUNT: 13 % (ref 11–15)
GLOBULIN PLAS-MCNC: 3.9 G/DL (ref 2.8–4.4)
GLUCOSE BLD-MCNC: 94 MG/DL (ref 70–99)
GLUCOSE UR-MCNC: NEGATIVE MG/DL
HCT VFR BLD AUTO: 48.2 % (ref 39–53)
HDLC SERPL-MCNC: 47 MG/DL (ref 40–59)
HGB BLD-MCNC: 15.9 G/DL (ref 13–17.5)
IMM GRANULOCYTES # BLD AUTO: 0.01 X10(3) UL (ref 0–1)
IMM GRANULOCYTES NFR BLD: 0.2 %
KETONES UR-MCNC: NEGATIVE MG/DL
LDLC SERPL CALC-MCNC: 119 MG/DL (ref ?–100)
LEUKOCYTE ESTERASE UR QL STRIP.AUTO: NEGATIVE
LYMPHOCYTES # BLD AUTO: 1.8 X10(3) UL (ref 1–4)
LYMPHOCYTES NFR BLD AUTO: 35.3 %
M PROTEIN MFR SERPL ELPH: 8 G/DL (ref 6.4–8.2)
MCH RBC QN AUTO: 27.7 PG (ref 26–34)
MCHC RBC AUTO-ENTMCNC: 33 G/DL (ref 31–37)
MCV RBC AUTO: 84 FL (ref 80–100)
MONOCYTES # BLD AUTO: 0.53 X10(3) UL (ref 0.1–1)
MONOCYTES NFR BLD AUTO: 10.4 %
NEUTROPHILS # BLD AUTO: 2.65 X10 (3) UL (ref 1.5–7.7)
NEUTROPHILS # BLD AUTO: 2.65 X10(3) UL (ref 1.5–7.7)
NEUTROPHILS NFR BLD AUTO: 51.9 %
NITRITE UR QL STRIP.AUTO: NEGATIVE
NONHDLC SERPL-MCNC: 182 MG/DL (ref ?–130)
OSMOLALITY SERPL CALC.SUM OF ELEC: 289 MOSM/KG (ref 275–295)
PATIENT FASTING Y/N/NP: YES
PATIENT FASTING Y/N/NP: YES
PH UR: 5 [PH] (ref 5–8)
PLATELET # BLD AUTO: 218 10(3)UL (ref 150–450)
POTASSIUM SERPL-SCNC: 4.2 MMOL/L (ref 3.5–5.1)
PROT UR-MCNC: NEGATIVE MG/DL
RBC # BLD AUTO: 5.74 X10(6)UL (ref 4.3–5.7)
RBC #/AREA URNS AUTO: 1 /HPF
SODIUM SERPL-SCNC: 139 MMOL/L (ref 136–145)
SP GR UR STRIP: 1.02 (ref 1–1.03)
TRIGL SERPL-MCNC: 313 MG/DL (ref 30–149)
TSI SER-ACNC: 1.4 MIU/ML (ref 0.36–3.74)
UROBILINOGEN UR STRIP-ACNC: <2
VLDLC SERPL CALC-MCNC: 63 MG/DL (ref 0–30)
WBC # BLD AUTO: 5.1 X10(3) UL (ref 4–11)
WBC #/AREA URNS AUTO: 1 /HPF

## 2020-09-17 PROCEDURE — 3075F SYST BP GE 130 - 139MM HG: CPT | Performed by: FAMILY MEDICINE

## 2020-09-17 PROCEDURE — 99396 PREV VISIT EST AGE 40-64: CPT | Performed by: FAMILY MEDICINE

## 2020-09-17 PROCEDURE — 3080F DIAST BP >= 90 MM HG: CPT | Performed by: FAMILY MEDICINE

## 2020-09-17 PROCEDURE — 3008F BODY MASS INDEX DOCD: CPT | Performed by: FAMILY MEDICINE

## 2020-09-17 PROCEDURE — 93000 ELECTROCARDIOGRAM COMPLETE: CPT | Performed by: FAMILY MEDICINE

## 2020-09-17 NOTE — PROGRESS NOTES
HPI:    Patient ID: Angella Givens is a 52year old male.     This patient is a 61-year-old -American male here for complete preventive care physical and for status update on any confirmed chronic medical illnesses and follow up on any previous labs PANEL  - TSH W REFLEX TO FREE T4  - URINALYSIS, ROUTINE  - CBC W/ DIFFERENTIAL    2.  Essential hypertension  We will consider initiation of blood pressure medication as well as an echocardiogram pending the rest of the patient's lab test results.  - ELECTR

## 2020-09-24 ENCOUNTER — TELEPHONE (OUTPATIENT)
Dept: GASTROENTEROLOGY | Facility: CLINIC | Age: 50
End: 2020-09-24

## 2020-09-24 DIAGNOSIS — Z86.010 HISTORY OF COLON POLYPS: Primary | ICD-10-CM

## 2020-09-24 RX ORDER — POLYETHYLENE GLYCOL 3350, SODIUM CHLORIDE, SODIUM BICARBONATE, POTASSIUM CHLORIDE 420; 11.2; 5.72; 1.48 G/4L; G/4L; G/4L; G/4L
POWDER, FOR SOLUTION ORAL
Qty: 1 BOTTLE | Refills: 0 | Status: SHIPPED | OUTPATIENT
Start: 2020-09-24 | End: 2021-09-20

## 2020-09-24 RX ORDER — ERGOCALCIFEROL (VITAMIN D2) 10 MCG
1 TABLET ORAL DAILY
COMMUNITY
End: 2021-09-20 | Stop reason: ALTCHOICE

## 2020-09-24 NOTE — TELEPHONE ENCOUNTER
Signed             Show:Clear all  [x]Manual[x]Template[]Copied    Added by:  [x]Jacqueline Jesus MD    []Crispin for details  [facility] Endoscopy Report        Preoperative Diagnosis:  - colon screening  - family history of GI malignancy Small internal hemorrhoids     The esophagus was normal.  The GE junction and diaphragmatic impression were at 39 cm. The stomach distended appropriately with insufflated air. The mucosa of the stomach showed mild erythema, biopsies taken.   The duodenal

## 2020-09-24 NOTE — TELEPHONE ENCOUNTER
The patient's chart has been reviewed. Okay to schedule pt for 3 year CLN recall r/t hx colon polyps with Dr. Catherine Escobedo.      Advise IV Twilight or MAC sedation with split dose Colyte/TriLyte or equivalent(eRx) preparation.   -Eligible for NE: Yes  -Denies hist

## 2020-09-24 NOTE — TELEPHONE ENCOUNTER
Notes Recorded by Mikie Mcmanus DO on 10/31/2017 at 9:29 AM CDT  Results already discussed with patient via GI specialist.  ------     Notes Recorded by Alek Jo MD on 10/30/2017 at 5:55 PM CDT  Colon polyp 1 cm in size, repeat colonoscopy species seen on Giemsa stain (with appropriate control reactivity).     Electronically signed by David Vanegas MD on 10/30/2017 at 10:27 AM      Clinical Information     Z12.11 Screening For Malignant Neoplasm Of Colon.   Z80.0 Family History Of Gastric

## 2020-09-24 NOTE — TELEPHONE ENCOUNTER
Last Procedure, Date, MD:  Dr. Zack Stack colonoscopy/EGD 10/28/2017  Last Diagnosis:  Colon polyp, diverticulosis, hemorrhoids gastritis  Recalled for (mth/yrs): colonoscopy 3 years  Sedation used previously:  IV  Last Prep Used (if known): n/a   Quality of pr

## 2020-09-30 NOTE — TELEPHONE ENCOUNTER
Scheduled for:  Colon [id number]  Provider Name:  Dr Latasha Silverio  Date:  12/08/2020  Location:  [facility]  Sedation:  MAC  Time:  0730 (pt is aware to arrive at 0630)  Prep:  Trilyte  Meds/Allergies Reconciled?:  Physician reviewed  Diagnosis with codes:  History of Colon

## 2020-11-12 ENCOUNTER — HOSPITAL ENCOUNTER (OUTPATIENT)
Age: 50
Discharge: HOME | End: 2020-11-12
Payer: COMMERCIAL

## 2020-11-12 VITALS
HEART RATE: 98 BPM | TEMPERATURE: 99 F | RESPIRATION RATE: 21 BRPM | SYSTOLIC BLOOD PRESSURE: 143 MMHG | DIASTOLIC BLOOD PRESSURE: 112 MMHG | OXYGEN SATURATION: 98 %

## 2020-11-12 DIAGNOSIS — R03.0 ELEVATED BLOOD PRESSURE READING: ICD-10-CM

## 2020-11-12 DIAGNOSIS — Z20.822 ENCOUNTER FOR SCREENING LABORATORY TESTING FOR COVID-19 VIRUS IN ASYMPTOMATIC PATIENT: Primary | ICD-10-CM

## 2020-11-12 DIAGNOSIS — Z20.822 EXPOSURE TO COVID-19 VIRUS: ICD-10-CM

## 2020-11-12 PROCEDURE — 99202 OFFICE O/P NEW SF 15 MIN: CPT | Performed by: NURSE PRACTITIONER

## 2020-11-13 NOTE — ED PROVIDER NOTES
Patient Seen in: [facility]      History   Patient presents with:  Testing    Stated Complaint: Testing    HPI    This is a 29-year-old male presenting for Covid testing.   Patient states he is here for Covid testing coworker tested positive Eyes:      Conjunctiva/sclera: Conjunctivae normal.      Pupils: Pupils are equal, round, and reactive to light. Neck:      Musculoskeletal: Normal range of motion. Cardiovascular:      Rate and Rhythm: Normal rate.       Heart sounds: Normal heart so Prescribed:  Current Discharge Medication List

## 2020-11-13 NOTE — ED INITIAL ASSESSMENT (HPI)
Pt here for covid testing.  Pt states one of his coworkers he was in close contact with tested +covid, pt denies any symptoms at this point

## 2020-12-03 ENCOUNTER — TELEPHONE (OUTPATIENT)
Dept: GASTROENTEROLOGY | Facility: CLINIC | Age: 50
End: 2020-12-03

## 2020-12-03 RX ORDER — POLYETHYLENE GLYCOL 3350, SODIUM CHLORIDE, SODIUM BICARBONATE, POTASSIUM CHLORIDE 420; 11.2; 5.72; 1.48 G/4L; G/4L; G/4L; G/4L
POWDER, FOR SOLUTION ORAL
Qty: 1 BOTTLE | Refills: 0 | OUTPATIENT
Start: 2020-12-03

## 2020-12-03 NOTE — TELEPHONE ENCOUNTER
Pt calling to get a new script for his prep. The pharmacy no longer has it.  Please advise  PEG 3350-KCl-Na Bicarb-NaCl (TRILYTE) 420 g Oral Recon Soln 1 Bottle 0 9/24/2020    Sig:   Take prep

## 2020-12-03 NOTE — TELEPHONE ENCOUNTER
I spoke with the pharmacist at [facility] at #[phone]. They still have the order for prep on file. They will fill this prescription and call the patient when it is ready for .

## 2020-12-03 NOTE — TELEPHONE ENCOUNTER
Noted.  I have declined Rx as this is already sent on 9/24/2020. If new prescription is needed, please have the pharmacy notify us.

## 2020-12-03 NOTE — TELEPHONE ENCOUNTER
Requested Prescriptions     Pending Prescriptions Disp Refills   • PEG 3350-KCl-Na Bicarb-NaCl (TRILYTE) 420 g Oral Recon Soln 1 Bottle 0     Sig: Take prep as directed by gastro office.  May substitute with Trilyte/generic equivalent if needed     Rx sent

## 2020-12-04 NOTE — PAT NURSING NOTE
Client aware of procedure location, NE. Arrival time 0630, aware no visitors allowed at that particular location, client agreeable. Client communicates he has prep and diet restrictions in preparation for procedure.

## 2020-12-05 ENCOUNTER — APPOINTMENT (OUTPATIENT)
Dept: LAB | Facility: HOSPITAL | Age: 50
End: 2020-12-05
Attending: INTERNAL MEDICINE
Payer: COMMERCIAL

## 2020-12-05 DIAGNOSIS — Z01.818 PRE-OP TESTING: ICD-10-CM

## 2020-12-08 ENCOUNTER — TELEPHONE (OUTPATIENT)
Dept: GASTROENTEROLOGY | Facility: CLINIC | Age: 50
End: 2020-12-08

## 2020-12-08 ENCOUNTER — HOSPITAL ENCOUNTER (OUTPATIENT)
Age: 50
Setting detail: HOSPITAL OUTPATIENT SURGERY
Discharge: HOME | End: 2020-12-08
Attending: INTERNAL MEDICINE | Admitting: INTERNAL MEDICINE
Payer: COMMERCIAL

## 2020-12-08 VITALS
RESPIRATION RATE: 12 BRPM | SYSTOLIC BLOOD PRESSURE: 152 MMHG | DIASTOLIC BLOOD PRESSURE: 110 MMHG | OXYGEN SATURATION: 99 % | BODY MASS INDEX: 25.73 KG/M2 | HEIGHT: 72 IN | HEART RATE: 80 BPM | WEIGHT: 190 LBS

## 2020-12-08 DIAGNOSIS — Z86.010 HISTORY OF COLON POLYPS: ICD-10-CM

## 2020-12-08 DIAGNOSIS — Z01.818 PRE-OP TESTING: Primary | ICD-10-CM

## 2020-12-08 RX ORDER — SODIUM CHLORIDE, SODIUM LACTATE, POTASSIUM CHLORIDE, CALCIUM CHLORIDE 600; 310; 30; 20 MG/100ML; MG/100ML; MG/100ML; MG/100ML
INJECTION, SOLUTION INTRAVENOUS CONTINUOUS
Status: DISCONTINUED | OUTPATIENT
Start: 2020-12-08 | End: 2020-12-08

## 2020-12-08 NOTE — H&P
History & Physical Examination    Patient Name: Aracely Slade  MRN: [id number]  [facility]: [id number]  YOB: 1970    Diagnosis: history of colon polyp         •  Multiple Vitamin (DAILY VALUE MULTIVITAMIN) Oral Tab, Take 1 tablet by mouth daily. , D and benefits and alternatives with the patient/family. They understand and agree to proceed with plan of care. [ x ] I have reviewed the History and Physical done within the last 30 days. Any changes noted above. Rashaad Jesus  12/8/2020  7:43 AM

## 2020-12-08 NOTE — OR NURSING
Case cancelled by anesthesia. Bp levels running too high, diastolic at 828 to 306. He has no symptoms. Patient advised by Dr. Clarence Sesay to follow up with primary care physician.

## 2020-12-08 NOTE — TELEPHONE ENCOUNTER
Case cancelled by anesthesia. Bp levels running too high, diastolic at 971 to 749.     He has no symptoms.      Follow up with primary care, Dr. Kadeem summers control of blood pressure  Schedulers please reach out to the pt and reschedule in first quarter o

## 2020-12-08 NOTE — PROGRESS NOTES
Case cancelled by anesthesia. Bp levels running too high, diastolic at 247 to 697. He has no symptoms.      Follow up with primary care, Dr. Kayla summers control of blood pressure  Schedulers please reach out to the pt and reschedule in first quarter of

## 2020-12-31 ENCOUNTER — OFFICE VISIT (OUTPATIENT)
Dept: FAMILY MEDICINE CLINIC | Facility: CLINIC | Age: 50
End: 2020-12-31
Payer: COMMERCIAL

## 2020-12-31 VITALS
HEART RATE: 82 BPM | RESPIRATION RATE: 17 BRPM | HEIGHT: 72 IN | TEMPERATURE: 97 F | BODY MASS INDEX: 25.47 KG/M2 | DIASTOLIC BLOOD PRESSURE: 102 MMHG | SYSTOLIC BLOOD PRESSURE: 130 MMHG | WEIGHT: 188 LBS

## 2020-12-31 DIAGNOSIS — R29.818 SUSPECTED SLEEP APNEA: ICD-10-CM

## 2020-12-31 DIAGNOSIS — I10 RESISTANT HYPERTENSION: ICD-10-CM

## 2020-12-31 DIAGNOSIS — I10 RESISTANT HYPERTENSION: Primary | ICD-10-CM

## 2020-12-31 PROCEDURE — 3075F SYST BP GE 130 - 139MM HG: CPT | Performed by: FAMILY MEDICINE

## 2020-12-31 PROCEDURE — 99214 OFFICE O/P EST MOD 30 MIN: CPT | Performed by: FAMILY MEDICINE

## 2020-12-31 PROCEDURE — 3008F BODY MASS INDEX DOCD: CPT | Performed by: FAMILY MEDICINE

## 2020-12-31 PROCEDURE — 3080F DIAST BP >= 90 MM HG: CPT | Performed by: FAMILY MEDICINE

## 2020-12-31 RX ORDER — LOSARTAN POTASSIUM AND HYDROCHLOROTHIAZIDE 12.5; 5 MG/1; MG/1
1 TABLET ORAL DAILY
Qty: 90 TABLET | Refills: 1 | Status: SHIPPED | OUTPATIENT
Start: 2020-12-31 | End: 2021-01-27

## 2020-12-31 RX ORDER — LOSARTAN POTASSIUM AND HYDROCHLOROTHIAZIDE 12.5; 5 MG/1; MG/1
1 TABLET ORAL DAILY
Qty: 90 TABLET | Refills: 1 | Status: SHIPPED | OUTPATIENT
Start: 2020-12-31 | End: 2020-12-31

## 2020-12-31 NOTE — PATIENT INSTRUCTIONS
Monitor blood pressures and record at home. Limit salt intake. Encouraged physical fitness and daily physical activity daily (PLAY). Home sleep study recommended in lieu of apnea suspect.   Patient is going to be started on losartan/HCTZ 50/12.5 mg to be

## 2020-12-31 NOTE — PROGRESS NOTES
HPI:    Patient ID: Veronica Ellis is a 52year old male. This patient is a 28-year-old -American gentleman who is well-established at our clinic who presents today with persistently high blood pressure although asymptomatic.   No chest pain, hea ASSESSMENT/PLAN:   1. Resistant hypertension  Blood pressure medication started on today. Patient is agreeable to home sleep study to rule out secondary cause hypertension.  - GENERAL SLEEP STUDY TRANSCRIPTION;  Future  - Losartan Potassium-HCTZ 50-12.5

## 2021-01-27 DIAGNOSIS — I10 RESISTANT HYPERTENSION: ICD-10-CM

## 2021-01-27 RX ORDER — LOSARTAN POTASSIUM AND HYDROCHLOROTHIAZIDE 12.5; 5 MG/1; MG/1
1 TABLET ORAL DAILY
Qty: 90 TABLET | Refills: 1 | Status: SHIPPED | OUTPATIENT
Start: 2021-01-27 | End: 2021-02-28

## 2021-02-28 DIAGNOSIS — I10 RESISTANT HYPERTENSION: ICD-10-CM

## 2021-03-01 DIAGNOSIS — I10 RESISTANT HYPERTENSION: ICD-10-CM

## 2021-03-01 RX ORDER — LOSARTAN POTASSIUM AND HYDROCHLOROTHIAZIDE 12.5; 5 MG/1; MG/1
1 TABLET ORAL DAILY
Qty: 90 TABLET | Refills: 1 | Status: SHIPPED | OUTPATIENT
Start: 2021-03-01 | End: 2021-04-01

## 2021-03-01 RX ORDER — LOSARTAN POTASSIUM AND HYDROCHLOROTHIAZIDE 12.5; 5 MG/1; MG/1
1 TABLET ORAL DAILY
Qty: 90 TABLET | Refills: 1 | Status: SHIPPED | OUTPATIENT
Start: 2021-03-01 | End: 2021-03-31

## 2021-03-12 DIAGNOSIS — Z23 NEED FOR VACCINATION: ICD-10-CM

## 2021-03-15 ENCOUNTER — IMMUNIZATION (OUTPATIENT)
Dept: LAB | Age: 51
End: 2021-03-15
Attending: HOSPITALIST
Payer: COMMERCIAL

## 2021-03-15 DIAGNOSIS — Z23 NEED FOR VACCINATION: Primary | ICD-10-CM

## 2021-03-15 PROCEDURE — 0001A SARSCOV2 VAC 30MCG/0.3ML IM: CPT

## 2021-03-31 DIAGNOSIS — I10 RESISTANT HYPERTENSION: ICD-10-CM

## 2021-03-31 RX ORDER — LOSARTAN POTASSIUM AND HYDROCHLOROTHIAZIDE 12.5; 5 MG/1; MG/1
1 TABLET ORAL DAILY
Qty: 90 TABLET | Refills: 1 | Status: SHIPPED | OUTPATIENT
Start: 2021-03-31 | End: 2021-04-27

## 2021-04-01 DIAGNOSIS — I10 RESISTANT HYPERTENSION: ICD-10-CM

## 2021-04-01 RX ORDER — LOSARTAN POTASSIUM AND HYDROCHLOROTHIAZIDE 12.5; 5 MG/1; MG/1
1 TABLET ORAL DAILY
Qty: 90 TABLET | Refills: 1 | Status: SHIPPED | OUTPATIENT
Start: 2021-04-01 | End: 2021-04-28

## 2021-04-05 ENCOUNTER — IMMUNIZATION (OUTPATIENT)
Dept: LAB | Age: 51
End: 2021-04-05
Attending: HOSPITALIST
Payer: COMMERCIAL

## 2021-04-05 DIAGNOSIS — Z23 NEED FOR VACCINATION: Primary | ICD-10-CM

## 2021-04-05 PROCEDURE — 0002A SARSCOV2 VAC 30MCG/0.3ML IM: CPT

## 2021-04-15 ENCOUNTER — HOSPITAL ENCOUNTER (OUTPATIENT)
Age: 51
Discharge: HOME | End: 2021-04-15
Payer: COMMERCIAL

## 2021-04-15 ENCOUNTER — APPOINTMENT (OUTPATIENT)
Dept: GENERAL RADIOLOGY | Age: 51
End: 2021-04-15
Attending: NURSE PRACTITIONER
Payer: COMMERCIAL

## 2021-04-15 VITALS
RESPIRATION RATE: 20 BRPM | HEIGHT: 72 IN | BODY MASS INDEX: 25.19 KG/M2 | TEMPERATURE: 99 F | HEART RATE: 82 BPM | OXYGEN SATURATION: 99 % | WEIGHT: 186 LBS | SYSTOLIC BLOOD PRESSURE: 146 MMHG | DIASTOLIC BLOOD PRESSURE: 103 MMHG

## 2021-04-15 DIAGNOSIS — M79.672 LEFT FOOT PAIN: Primary | ICD-10-CM

## 2021-04-15 PROCEDURE — 73630 X-RAY EXAM OF FOOT: CPT | Performed by: NURSE PRACTITIONER

## 2021-04-15 PROCEDURE — 99213 OFFICE O/P EST LOW 20 MIN: CPT | Performed by: NURSE PRACTITIONER

## 2021-04-15 NOTE — ED PROVIDER NOTES
Patient Seen in: [facility]      History   Patient presents with:   Foot Pain: Entered by patient    Stated Complaint: Leg or Foot Injury    HPI/Subjective:   HPI    This is a 19-year-old male with past medical history of high blood pressure °C) (Temporal)   Resp 20   Ht 182.9 cm (6')   Wt 84.4 kg   SpO2 99%   BMI 25.23 kg/m²         Physical Exam  Vitals and nursing note reviewed. Constitutional:       Appearance: Normal appearance. HENT:      Head: Normocephalic.       Right Ear: External X-ray negative for any acute fracture dislocation. Discussed result with the patient. Discussed over-the-counter Tylenol for pain.   Discussed rice therapy and elevating the lower extremity while at rest.  Discussed following up with podiatry resource was

## 2021-04-16 ENCOUNTER — TELEPHONE (OUTPATIENT)
Dept: FAMILY MEDICINE CLINIC | Facility: CLINIC | Age: 51
End: 2021-04-16

## 2021-04-16 DIAGNOSIS — M79.672 LEFT FOOT PAIN: Primary | ICD-10-CM

## 2021-04-16 NOTE — TELEPHONE ENCOUNTER
Per patient he was in urgent care [facility] yesterday for his left foot for arthritis and UC doctor told him to go and see a podiatrist doctor and since he has an HMO he needs a referral to see a podiatrist today.  Offered an appointment with his PCP but ref

## 2021-04-20 ENCOUNTER — OFFICE VISIT (OUTPATIENT)
Dept: PODIATRY CLINIC | Facility: CLINIC | Age: 51
End: 2021-04-20
Payer: COMMERCIAL

## 2021-04-20 DIAGNOSIS — M84.375A STRESS FRACTURE OF METATARSAL BONE OF LEFT FOOT, INITIAL ENCOUNTER: Primary | ICD-10-CM

## 2021-04-20 PROCEDURE — 99213 OFFICE O/P EST LOW 20 MIN: CPT | Performed by: PODIATRIST

## 2021-04-20 NOTE — PROGRESS NOTES
HPI:    Patient ID: Cristina Vallejo is a 54year old male. This 53-year-old male presents to the office today with a new complaint. He is having left forefoot pain and swelling. He is not aware of an injury and is not certain of the cause.   It bothers h a stress fracture of the third metatarsal.  Based on timing I do not see reason for a new x-ray at this point and therefore I encouraged shoes on at all times meaning no barefoot walking, ibuprofen 400 mg 3 times per day with meals and ice 2-3 times per [employer]

## 2021-04-27 ENCOUNTER — OFFICE VISIT (OUTPATIENT)
Dept: FAMILY MEDICINE CLINIC | Facility: CLINIC | Age: 51
End: 2021-04-27
Payer: COMMERCIAL

## 2021-04-27 VITALS
TEMPERATURE: 98 F | SYSTOLIC BLOOD PRESSURE: 110 MMHG | HEIGHT: 72 IN | WEIGHT: 190 LBS | HEART RATE: 97 BPM | RESPIRATION RATE: 17 BRPM | DIASTOLIC BLOOD PRESSURE: 86 MMHG | BODY MASS INDEX: 25.73 KG/M2

## 2021-04-27 DIAGNOSIS — M79.672 LEFT FOOT PAIN: Primary | ICD-10-CM

## 2021-04-27 DIAGNOSIS — M84.375D STRESS FRACTURE OF METATARSAL BONE OF LEFT FOOT WITH ROUTINE HEALING, SUBSEQUENT ENCOUNTER: ICD-10-CM

## 2021-04-27 PROCEDURE — 3074F SYST BP LT 130 MM HG: CPT | Performed by: FAMILY MEDICINE

## 2021-04-27 PROCEDURE — 3008F BODY MASS INDEX DOCD: CPT | Performed by: FAMILY MEDICINE

## 2021-04-27 PROCEDURE — 3079F DIAST BP 80-89 MM HG: CPT | Performed by: FAMILY MEDICINE

## 2021-04-27 PROCEDURE — 99214 OFFICE O/P EST MOD 30 MIN: CPT | Performed by: FAMILY MEDICINE

## 2021-04-27 NOTE — PROGRESS NOTES
HPI/Subjective:   Patient ID: Awilda Tariq is a 54year old male.     This is a 68-year-old -American gentleman who is well-established at our clinic who is doing a follow-up regarding a presumed diagnosis of left foot stress fracture of one of th Rate and Rhythm: Normal rate and regular rhythm. Heart sounds: Normal heart sounds. Pulmonary:      Effort: Pulmonary effort is normal.      Breath sounds: Normal breath sounds.    Musculoskeletal:        Feet:    Feet:      Comments: Area of foot

## 2021-04-27 NOTE — PATIENT INSTRUCTIONS
Continue with recommendations for the foot doctor. I still recommend weightbearing only as needed. It might be best to wear shoes with a defined arch moving forward and for future foot health.   We will consider repeat x-ray of the left foot in 3 to 4 wee

## 2021-04-28 DIAGNOSIS — I10 RESISTANT HYPERTENSION: ICD-10-CM

## 2021-04-28 RX ORDER — LOSARTAN POTASSIUM AND HYDROCHLOROTHIAZIDE 12.5; 5 MG/1; MG/1
1 TABLET ORAL DAILY
Qty: 90 TABLET | Refills: 1 | Status: SHIPPED | OUTPATIENT
Start: 2021-04-28 | End: 2021-05-29

## 2021-05-05 ENCOUNTER — TELEPHONE (OUTPATIENT)
Dept: PODIATRY CLINIC | Facility: CLINIC | Age: 51
End: 2021-05-05

## 2021-05-05 NOTE — TELEPHONE ENCOUNTER
Patient requesting note for missing work 5/3-5/8 due to left foot stress fracture. Patient has an appointment scheduled 5/11. Patient requesting note to be sent through his [employer], nafisa.

## 2021-05-11 ENCOUNTER — OFFICE VISIT (OUTPATIENT)
Dept: PODIATRY CLINIC | Facility: CLINIC | Age: 51
End: 2021-05-11
Payer: COMMERCIAL

## 2021-05-11 ENCOUNTER — HOSPITAL ENCOUNTER (OUTPATIENT)
Dept: GENERAL RADIOLOGY | Facility: HOSPITAL | Age: 51
Discharge: HOME | End: 2021-05-11
Attending: PODIATRIST
Payer: COMMERCIAL

## 2021-05-11 ENCOUNTER — TELEPHONE (OUTPATIENT)
Dept: PODIATRY CLINIC | Facility: CLINIC | Age: 51
End: 2021-05-11

## 2021-05-11 DIAGNOSIS — R52 PAIN: Primary | ICD-10-CM

## 2021-05-11 DIAGNOSIS — R52 PAIN: ICD-10-CM

## 2021-05-11 DIAGNOSIS — M84.375D STRESS FRACTURE OF METATARSAL BONE OF LEFT FOOT WITH ROUTINE HEALING, SUBSEQUENT ENCOUNTER: ICD-10-CM

## 2021-05-11 PROCEDURE — 99213 OFFICE O/P EST LOW 20 MIN: CPT | Performed by: PODIATRIST

## 2021-05-11 PROCEDURE — 73630 X-RAY EXAM OF FOOT: CPT | Performed by: PODIATRIST

## 2021-05-11 NOTE — PROGRESS NOTES
HPI:    Patient ID: Nirav Yeh is a 54year old male. This 24-year-old male presents for follow-up and review in reference to the pain associated with the left forefoot.   He would state that he is better but continues to aching discomfort depending Prescriptions      No prescriptions requested or ordered in this encounter       Imaging & Referrals:  None       [id number]

## 2021-05-11 NOTE — TELEPHONE ENCOUNTER
Per pt is needing note for work to include dates he will be out of work, 5/17/21-6/5/21.  Please place new note on mychart, please advise

## 2021-05-11 NOTE — TELEPHONE ENCOUNTER
He has a stress fracture of the 3rd metatarsal left foot. I plan to see him for rina in 3 weeks.  Thank you scr

## 2021-05-12 NOTE — TELEPHONE ENCOUNTER
S/w pt and offered him 3 wk f/u appt on 6/1/21 at [facility] with Dr. Ryan Jarvis. Advised him I can make note until his f/u appt and if he needs a new note at that time Dr Ryan Jarvis can provide. Will send letter through [employer].  Pt states he is on vacation this week so

## 2021-05-29 DIAGNOSIS — I10 RESISTANT HYPERTENSION: ICD-10-CM

## 2021-05-29 RX ORDER — LOSARTAN POTASSIUM AND HYDROCHLOROTHIAZIDE 12.5; 5 MG/1; MG/1
1 TABLET ORAL DAILY
Qty: 90 TABLET | Refills: 1 | Status: SHIPPED | OUTPATIENT
Start: 2021-05-29 | End: 2021-06-30

## 2021-06-01 ENCOUNTER — HOSPITAL ENCOUNTER (OUTPATIENT)
Dept: GENERAL RADIOLOGY | Facility: HOSPITAL | Age: 51
Discharge: HOME | End: 2021-06-01
Attending: PODIATRIST
Payer: COMMERCIAL

## 2021-06-01 ENCOUNTER — OFFICE VISIT (OUTPATIENT)
Dept: PODIATRY CLINIC | Facility: CLINIC | Age: 51
End: 2021-06-01
Payer: COMMERCIAL

## 2021-06-01 DIAGNOSIS — Z47.89 ORTHOPEDIC AFTERCARE: ICD-10-CM

## 2021-06-01 DIAGNOSIS — Z47.89 ORTHOPEDIC AFTERCARE: Primary | ICD-10-CM

## 2021-06-01 DIAGNOSIS — M84.375D STRESS FRACTURE OF METATARSAL BONE OF LEFT FOOT WITH ROUTINE HEALING, SUBSEQUENT ENCOUNTER: ICD-10-CM

## 2021-06-01 PROCEDURE — 99213 OFFICE O/P EST LOW 20 MIN: CPT | Performed by: PODIATRIST

## 2021-06-01 PROCEDURE — 73630 X-RAY EXAM OF FOOT: CPT | Performed by: PODIATRIST

## 2021-06-01 NOTE — PROGRESS NOTES
HPI:    Patient ID: Awilda Zamarripa is a 54year old male. 79-year-old male presents for follow-up in reference to stress fracture of the third metatarsal of the left foot.   Patient states he has had a dramatic and significant improvement he is doing very

## 2021-06-02 ENCOUNTER — TELEPHONE (OUTPATIENT)
Dept: PODIATRY CLINIC | Facility: CLINIC | Age: 51
End: 2021-06-02

## 2021-06-02 NOTE — TELEPHONE ENCOUNTER
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 Thank you.
Letter sent through mychart.
Per pt is needing note for work to be excused from 6/1/21-6/7/21. Please place note on mychart.
That's ok due to stress fracture, thanks scr
0

## 2021-06-29 DIAGNOSIS — I10 RESISTANT HYPERTENSION: ICD-10-CM

## 2021-06-30 DIAGNOSIS — I10 RESISTANT HYPERTENSION: ICD-10-CM

## 2021-06-30 RX ORDER — LOSARTAN POTASSIUM AND HYDROCHLOROTHIAZIDE 12.5; 5 MG/1; MG/1
1 TABLET ORAL DAILY
Qty: 90 TABLET | Refills: 1 | Status: SHIPPED | OUTPATIENT
Start: 2021-06-30 | End: 2021-07-31

## 2021-06-30 RX ORDER — LOSARTAN POTASSIUM AND HYDROCHLOROTHIAZIDE 12.5; 5 MG/1; MG/1
1 TABLET ORAL DAILY
Qty: 90 TABLET | Refills: 1 | Status: SHIPPED | OUTPATIENT
Start: 2021-06-30 | End: 2021-09-20

## 2021-06-30 RX ORDER — LOSARTAN POTASSIUM AND HYDROCHLOROTHIAZIDE 12.5; 5 MG/1; MG/1
1 TABLET ORAL DAILY
Qty: 90 TABLET | Refills: 1 | Status: SHIPPED | OUTPATIENT
Start: 2021-06-30 | End: 2021-09-28

## 2021-07-31 DIAGNOSIS — I10 RESISTANT HYPERTENSION: ICD-10-CM

## 2021-07-31 RX ORDER — LOSARTAN POTASSIUM AND HYDROCHLOROTHIAZIDE 12.5; 5 MG/1; MG/1
1 TABLET ORAL DAILY
Qty: 90 TABLET | Refills: 1 | Status: SHIPPED | OUTPATIENT
Start: 2021-07-31 | End: 2021-09-20

## 2021-08-31 DIAGNOSIS — I1A.0 RESISTANT HYPERTENSION: ICD-10-CM

## 2021-08-31 RX ORDER — LOSARTAN POTASSIUM AND HYDROCHLOROTHIAZIDE 12.5; 5 MG/1; MG/1
1 TABLET ORAL DAILY
Qty: 90 TABLET | Refills: 1 | Status: CANCELLED | OUTPATIENT
Start: 2021-08-31 | End: 2021-11-29

## 2021-09-20 ENCOUNTER — LAB ENCOUNTER (OUTPATIENT)
Dept: LAB | Age: 51
End: 2021-09-20
Attending: FAMILY MEDICINE
Payer: COMMERCIAL

## 2021-09-20 ENCOUNTER — OFFICE VISIT (OUTPATIENT)
Dept: FAMILY MEDICINE CLINIC | Facility: CLINIC | Age: 51
End: 2021-09-20
Payer: COMMERCIAL

## 2021-09-20 VITALS
WEIGHT: 196 LBS | BODY MASS INDEX: 26.55 KG/M2 | RESPIRATION RATE: 17 BRPM | SYSTOLIC BLOOD PRESSURE: 120 MMHG | DIASTOLIC BLOOD PRESSURE: 97 MMHG | HEART RATE: 77 BPM | HEIGHT: 72 IN

## 2021-09-20 DIAGNOSIS — Z12.11 COLON CANCER SCREENING: ICD-10-CM

## 2021-09-20 DIAGNOSIS — Z00.00 ROUTINE PHYSICAL EXAMINATION: ICD-10-CM

## 2021-09-20 DIAGNOSIS — I10 ESSENTIAL HYPERTENSION: Primary | ICD-10-CM

## 2021-09-20 LAB
ALBUMIN SERPL-MCNC: 3.7 G/DL (ref 3.4–5)
ALBUMIN/GLOB SERPL: 0.9 {RATIO} (ref 1–2)
ALP LIVER SERPL-CCNC: 47 U/L
ALT SERPL-CCNC: 30 U/L
ANION GAP SERPL CALC-SCNC: 4 MMOL/L (ref 0–18)
AST SERPL-CCNC: 19 U/L (ref 15–37)
BASOPHILS # BLD AUTO: 0.04 X10(3) UL (ref 0–0.2)
BASOPHILS NFR BLD AUTO: 1 %
BILIRUB SERPL-MCNC: 0.4 MG/DL (ref 0.1–2)
BILIRUB UR QL: NEGATIVE
BUN BLD-MCNC: 16 MG/DL (ref 7–18)
BUN/CREAT SERPL: 16 (ref 10–20)
CALCIUM BLD-MCNC: 9.1 MG/DL (ref 8.5–10.1)
CHLORIDE SERPL-SCNC: 106 MMOL/L (ref 98–112)
CHOLEST SERPL-MCNC: 212 MG/DL (ref ?–200)
CLARITY UR: CLEAR
CO2 SERPL-SCNC: 25 MMOL/L (ref 21–32)
COLOR UR: YELLOW
COMPLEXED PSA SERPL-MCNC: 1.28 NG/ML (ref ?–4)
CREAT BLD-MCNC: 1 MG/DL
DEPRECATED RDW RBC AUTO: 36.3 FL (ref 35.1–46.3)
EOSINOPHIL # BLD AUTO: 0.05 X10(3) UL (ref 0–0.7)
EOSINOPHIL NFR BLD AUTO: 1.2 %
ERYTHROCYTE [DISTWIDTH] IN BLOOD BY AUTOMATED COUNT: 12 % (ref 11–15)
GLOBULIN PLAS-MCNC: 4.2 G/DL (ref 2.8–4.4)
GLUCOSE BLD-MCNC: 107 MG/DL (ref 70–99)
GLUCOSE UR-MCNC: NEGATIVE MG/DL
HCT VFR BLD AUTO: 47.6 %
HDLC SERPL-MCNC: 47 MG/DL (ref 40–59)
HGB BLD-MCNC: 15.7 G/DL
IMM GRANULOCYTES # BLD AUTO: 0.01 X10(3) UL (ref 0–1)
IMM GRANULOCYTES NFR BLD: 0.2 %
KETONES UR-MCNC: NEGATIVE MG/DL
LDLC SERPL CALC-MCNC: 132 MG/DL (ref ?–100)
LEUKOCYTE ESTERASE UR QL STRIP.AUTO: NEGATIVE
LYMPHOCYTES # BLD AUTO: 1.5 X10(3) UL (ref 1–4)
LYMPHOCYTES NFR BLD AUTO: 37.3 %
MCH RBC QN AUTO: 27.5 PG (ref 26–34)
MCHC RBC AUTO-ENTMCNC: 33 G/DL (ref 31–37)
MCV RBC AUTO: 83.4 FL
MONOCYTES # BLD AUTO: 0.38 X10(3) UL (ref 0.1–1)
MONOCYTES NFR BLD AUTO: 9.5 %
NEUTROPHILS # BLD AUTO: 2.04 X10 (3) UL (ref 1.5–7.7)
NEUTROPHILS # BLD AUTO: 2.04 X10(3) UL (ref 1.5–7.7)
NEUTROPHILS NFR BLD AUTO: 50.8 %
NITRITE UR QL STRIP.AUTO: NEGATIVE
NONHDLC SERPL-MCNC: 165 MG/DL (ref ?–130)
OSMOLALITY SERPL CALC.SUM OF ELEC: 282 MOSM/KG (ref 275–295)
PATIENT FASTING Y/N/NP: YES
PATIENT FASTING Y/N/NP: YES
PH UR: 6 [PH] (ref 5–8)
PLATELET # BLD AUTO: 234 10(3)UL (ref 150–450)
POTASSIUM SERPL-SCNC: 3.8 MMOL/L (ref 3.5–5.1)
PROT SERPL-MCNC: 7.9 G/DL (ref 6.4–8.2)
PROT UR-MCNC: NEGATIVE MG/DL
RBC # BLD AUTO: 5.71 X10(6)UL
SODIUM SERPL-SCNC: 135 MMOL/L (ref 136–145)
SP GR UR STRIP: 1.01 (ref 1–1.03)
TRIGL SERPL-MCNC: 188 MG/DL (ref 30–149)
TSI SER-ACNC: 1.32 MIU/ML (ref 0.36–3.74)
UROBILINOGEN UR STRIP-ACNC: <2
VLDLC SERPL CALC-MCNC: 34 MG/DL (ref 0–30)
WBC # BLD AUTO: 4 X10(3) UL (ref 4–11)

## 2021-09-20 PROCEDURE — 3074F SYST BP LT 130 MM HG: CPT | Performed by: FAMILY MEDICINE

## 2021-09-20 PROCEDURE — 81001 URINALYSIS AUTO W/SCOPE: CPT

## 2021-09-20 PROCEDURE — 80061 LIPID PANEL: CPT

## 2021-09-20 PROCEDURE — 3008F BODY MASS INDEX DOCD: CPT | Performed by: FAMILY MEDICINE

## 2021-09-20 PROCEDURE — 3080F DIAST BP >= 90 MM HG: CPT | Performed by: FAMILY MEDICINE

## 2021-09-20 PROCEDURE — 93000 ELECTROCARDIOGRAM COMPLETE: CPT | Performed by: FAMILY MEDICINE

## 2021-09-20 PROCEDURE — 85025 COMPLETE CBC W/AUTO DIFF WBC: CPT

## 2021-09-20 PROCEDURE — 84443 ASSAY THYROID STIM HORMONE: CPT

## 2021-09-20 PROCEDURE — 36415 COLL VENOUS BLD VENIPUNCTURE: CPT

## 2021-09-20 PROCEDURE — 99396 PREV VISIT EST AGE 40-64: CPT | Performed by: FAMILY MEDICINE

## 2021-09-20 PROCEDURE — 80053 COMPREHEN METABOLIC PANEL: CPT

## 2021-09-20 NOTE — PROGRESS NOTES
Subjective:   Patient ID: Kirstin Subramanian is a 54year old male.     This patient is a 45-year-old -American male here for complete preventive care physical and for status update on any confirmed chronic medical illnesses and follow up on any previou when measured manually by physician. Decrease ETOH consumption recommended. - ELECTROCARDIOGRAM, COMPLETE    3. Colon cancer screening  Referred.   - GASTRO - INTERNAL    Orders Placed This Encounter      Lipid Panel [E]      TSH W Reflex To Free T4 [E]

## 2021-09-22 DIAGNOSIS — R73.09 ELEVATED GLUCOSE LEVEL: Primary | ICD-10-CM

## 2021-10-09 DIAGNOSIS — I10 RESISTANT HYPERTENSION: ICD-10-CM

## 2021-10-10 RX ORDER — VARDENAFIL HYDROCHLORIDE 20 MG/1
20 TABLET ORAL
Qty: 8 TABLET | Refills: 2 | Status: SHIPPED | OUTPATIENT
Start: 2021-10-10

## 2021-10-14 DIAGNOSIS — I10 RESISTANT HYPERTENSION: ICD-10-CM

## 2021-10-14 RX ORDER — LOSARTAN POTASSIUM AND HYDROCHLOROTHIAZIDE 12.5; 5 MG/1; MG/1
1 TABLET ORAL DAILY
Qty: 90 TABLET | Refills: 1 | Status: SHIPPED | OUTPATIENT
Start: 2021-10-14 | End: 2022-01-10

## 2021-10-14 RX ORDER — LOSARTAN POTASSIUM AND HYDROCHLOROTHIAZIDE 12.5; 5 MG/1; MG/1
1 TABLET ORAL DAILY
Qty: 90 TABLET | Refills: 1 | COMMUNITY
Start: 2021-10-14 | End: 2021-10-14

## 2021-11-18 DIAGNOSIS — I1A.0 RESISTANT HYPERTENSION: ICD-10-CM

## 2021-11-18 RX ORDER — LOSARTAN POTASSIUM AND HYDROCHLOROTHIAZIDE 12.5; 5 MG/1; MG/1
1 TABLET ORAL DAILY
Qty: 90 TABLET | Refills: 1 | OUTPATIENT
Start: 2021-11-18

## 2021-12-23 ENCOUNTER — IMMUNIZATION (OUTPATIENT)
Dept: LAB | Facility: HOSPITAL | Age: 51
End: 2021-12-23
Attending: EMERGENCY MEDICINE
Payer: COMMERCIAL

## 2021-12-23 DIAGNOSIS — Z23 NEED FOR VACCINATION: Primary | ICD-10-CM

## 2021-12-23 PROCEDURE — 0004A SARSCOV2 VAC 30MCG/0.3ML IM: CPT

## 2022-01-10 DIAGNOSIS — I1A.0 RESISTANT HYPERTENSION: ICD-10-CM

## 2022-01-10 RX ORDER — LOSARTAN POTASSIUM AND HYDROCHLOROTHIAZIDE 12.5; 5 MG/1; MG/1
1 TABLET ORAL DAILY
Qty: 90 TABLET | Refills: 1 | Status: SHIPPED | OUTPATIENT
Start: 2022-01-10 | End: 2022-02-23

## 2022-01-10 NOTE — TELEPHONE ENCOUNTER
Refill passed per [address] protocol. Requested Prescriptions   Pending Prescriptions Disp Refills    losartan-hydroCHLOROthiazide 50-12.5 MG Oral Tab 90 tablet 1     Sig: Take 1 tablet by mouth daily.         Hypertensive Medications Protocol Passed - 1/10/2022  7:45 AM        Passed - CMP or BMP in past 12 months        Passed - Appointment in past 6 or next 3 months        Passed - GFR  > 50     Lab Results   Component Value Date    GFRAA 102 09/20/2021                         Recent Outpatient Visits              3 months ago Essential hypertension    [address], [address] , [place], Demetria Kyle, DO    Office Visit    7 months ago Orthopedic aftercare    [facility], [address], [address] [place], [place]    Office Visit    8 months ago 100 [facility], [address], [address] [facility]    Office Visit    8 months ago Left foot pain    [facility], Demetria Kyle DO    Office Visit    8 months ago Stress fracture of metatarsal bone of left foot, initial encounter    [facility], [address], [address] [place], [place]    Office Visit

## 2022-01-24 ENCOUNTER — TELEPHONE (OUTPATIENT)
Dept: FAMILY MEDICINE CLINIC | Facility: CLINIC | Age: 52
End: 2022-01-24

## 2022-01-24 DIAGNOSIS — Z20.822 ENCOUNTER FOR SCREENING LABORATORY TESTING FOR COVID-19 VIRUS IN ASYMPTOMATIC PATIENT: Primary | ICD-10-CM

## 2022-01-24 NOTE — TELEPHONE ENCOUNTER
Patient is traveling out of the country next Monday and is requesting a COVID test. No COVID exposure and no symptoms. Please advise.

## 2022-01-28 ENCOUNTER — LAB ENCOUNTER (OUTPATIENT)
Dept: LAB | Facility: HOSPITAL | Age: 52
End: 2022-01-28
Attending: FAMILY MEDICINE
Payer: COMMERCIAL

## 2022-01-28 DIAGNOSIS — Z20.822 ENCOUNTER FOR SCREENING LABORATORY TESTING FOR COVID-19 VIRUS IN ASYMPTOMATIC PATIENT: ICD-10-CM

## 2022-01-28 LAB — SARS-COV-2 RNA RESP QL NAA+PROBE: NOT DETECTED

## 2022-02-23 RX ORDER — LOSARTAN POTASSIUM AND HYDROCHLOROTHIAZIDE 12.5; 5 MG/1; MG/1
1 TABLET ORAL DAILY
Qty: 90 TABLET | Refills: 1 | Status: SHIPPED | OUTPATIENT
Start: 2022-02-23

## 2022-02-23 NOTE — TELEPHONE ENCOUNTER
Refill passed per [address] protocol. Requested Prescriptions   Pending Prescriptions Disp Refills    losartan-hydroCHLOROthiazide 50-12.5 MG Oral Tab 90 tablet 1     Sig: Take 1 tablet by mouth daily.         Hypertensive Medications Protocol Passed - 2/23/2022  7:20 AM        Passed - CMP or BMP in past 12 months        Passed - Appointment in past 6 or next 3 months        Passed - GFR  > 50     Lab Results   Component Value Date    GFRAA 102 09/20/2021                     Recent Outpatient Visits              5 months ago Essential hypertension    [address], [address] , [place], Bobby Altamirano,     Office Visit    8 months ago Orthopedic aftercare    [facility], [address], [address] Anita DPM    Office Visit    9 months ago 100 [facility], [address], [address] Anita DPM    Office Visit    10 months ago Left foot pain    [facility]    Office Visit    10 months ago Stress fracture of metatarsal bone of left foot, initial encounter    [facility], [address], [address] Anita [facility]    Office Visit

## 2022-03-04 ENCOUNTER — OFFICE VISIT (OUTPATIENT)
Dept: FAMILY MEDICINE CLINIC | Facility: CLINIC | Age: 52
End: 2022-03-04
Payer: COMMERCIAL

## 2022-03-04 VITALS
DIASTOLIC BLOOD PRESSURE: 87 MMHG | SYSTOLIC BLOOD PRESSURE: 126 MMHG | TEMPERATURE: 98 F | BODY MASS INDEX: 26.43 KG/M2 | HEIGHT: 72 IN | HEART RATE: 64 BPM | WEIGHT: 195.13 LBS

## 2022-03-04 DIAGNOSIS — R03.0 ELEVATED BLOOD PRESSURE READING: ICD-10-CM

## 2022-03-04 DIAGNOSIS — R07.89 CHEST DISCOMFORT: Primary | ICD-10-CM

## 2022-03-04 DIAGNOSIS — M99.02 THORACIC REGION SOMATIC DYSFUNCTION: ICD-10-CM

## 2022-03-04 PROCEDURE — 3074F SYST BP LT 130 MM HG: CPT | Performed by: FAMILY MEDICINE

## 2022-03-04 PROCEDURE — 3008F BODY MASS INDEX DOCD: CPT | Performed by: FAMILY MEDICINE

## 2022-03-04 PROCEDURE — 98925 OSTEOPATH MANJ 1-2 REGIONS: CPT | Performed by: FAMILY MEDICINE

## 2022-03-04 PROCEDURE — 3079F DIAST BP 80-89 MM HG: CPT | Performed by: FAMILY MEDICINE

## 2022-03-04 PROCEDURE — 99214 OFFICE O/P EST MOD 30 MIN: CPT | Performed by: FAMILY MEDICINE

## 2022-03-04 NOTE — PATIENT INSTRUCTIONS
All adult screening ordered and done appropriate for patient's age and gender and risk factors and complaints. Medication reviewed and renewed where needed and appropriate. Monitor blood pressures and record at home. Limit salt intake. Encouraged physical fitness and daily physical activity daily. Echocardiogram ordered. Osteopathic manipulation performed in the thoracic region.

## 2022-03-04 NOTE — PROCEDURES
Multiple vertebral segments in the thoracic region misaligned. Manual osteopathic manipulative therapy performed and immediate relief obtained. Patient instantaneously improved.

## 2022-03-15 ENCOUNTER — HOSPITAL ENCOUNTER (OUTPATIENT)
Dept: CV DIAGNOSTICS | Facility: HOSPITAL | Age: 52
Discharge: HOME | End: 2022-03-15
Attending: FAMILY MEDICINE
Payer: COMMERCIAL

## 2022-03-15 DIAGNOSIS — R07.89 CHEST DISCOMFORT: ICD-10-CM

## 2022-03-15 DIAGNOSIS — R03.0 ELEVATED BLOOD PRESSURE READING: ICD-10-CM

## 2022-03-15 PROCEDURE — 93306 TTE W/DOPPLER COMPLETE: CPT | Performed by: FAMILY MEDICINE

## 2022-03-29 DIAGNOSIS — I1A.0 RESISTANT HYPERTENSION: ICD-10-CM

## 2022-03-29 RX ORDER — LOSARTAN POTASSIUM AND HYDROCHLOROTHIAZIDE 12.5; 5 MG/1; MG/1
1 TABLET ORAL DAILY
Qty: 90 TABLET | Refills: 1 | OUTPATIENT
Start: 2022-03-29

## 2022-05-06 ENCOUNTER — HOSPITAL ENCOUNTER (OUTPATIENT)
Age: 52
Discharge: LEFT BEFORE DISCHARGE | End: 2022-05-06
Payer: COMMERCIAL

## 2022-05-06 VITALS
RESPIRATION RATE: 18 BRPM | SYSTOLIC BLOOD PRESSURE: 119 MMHG | OXYGEN SATURATION: 100 % | HEART RATE: 73 BPM | DIASTOLIC BLOOD PRESSURE: 75 MMHG | TEMPERATURE: 98 F

## 2022-05-06 DIAGNOSIS — Z20.822 ENCOUNTER FOR LABORATORY TESTING FOR COVID-19 VIRUS: Primary | ICD-10-CM

## 2022-05-06 LAB — SARS-COV-2 RNA RESP QL NAA+PROBE: NOT DETECTED

## 2022-05-06 NOTE — ED QUICK NOTES
When David Olguin went to evaluate pt in room 4, pt was gone. Pt left without being seen by provider. Pt did get a covid tested and was resulted. Results seen by pt in [place].

## 2022-07-09 DIAGNOSIS — I10 RESISTANT HYPERTENSION: ICD-10-CM

## 2022-07-10 RX ORDER — LOSARTAN POTASSIUM AND HYDROCHLOROTHIAZIDE 12.5; 5 MG/1; MG/1
1 TABLET ORAL DAILY
Qty: 90 TABLET | Refills: 1 | Status: SHIPPED | OUTPATIENT
Start: 2022-07-10

## 2022-07-10 NOTE — TELEPHONE ENCOUNTER
Please review refill protocol failed/ no protocol  Requested Prescriptions   Pending Prescriptions Disp Refills    losartan-hydroCHLOROthiazide 50-12.5 MG Oral Tab 90 tablet 1     Sig: Take 1 tablet by mouth daily. Hypertensive Medications Protocol Failed - 7/9/2022 10:41 PM        Failed - CMP or BMP in past 6 months     No results found for this or any previous visit (from the past 4392 hour(s)).               Passed - In person appointment in the past 12 or next 3 months       Recent Outpatient Visits              4 months ago Chest discomfort    [address] [place], [address], Towner[place]inda, DO    Office Visit    9 months ago Essential hypertension    [address] [place], [address], Towner[place], DO    Office Visit    1 year ago Orthopedic aftercare [facility], [address], [address], Elwanda Leopard, DPM    Office Visit    1 year ago 100 [facility], [address], [address], Elwanda Leopard, DPM    Office Visit    1 year ago Left foot pain    150 [facility], Linda Villagran, DO    Office Visit                 Passed - Last BP reading less than 140/90     BP Readings from Last 1 Encounters:  05/06/22 : 119/75                Passed - In person appointment or virtual visit in the past 6 months       Recent Outpatient Visits              4 months ago Chest discomfort    [address] [place], [address], Towner[place]inda, DO    Office Visit    9 months ago Essential hypertension    [address] [place], [address], Towner[place]inda, DO    Office Visit    1 year ago Orthopedic aftercare [facility], [address], [address], Elwanda Leopard, DPM    Office Visit    1 year ago 100 [facility], [address], [address], Elwanda Leopard, DPM    Office Visit    1 year ago Left foot pain    [address] [place], [address], [place] Brooklyn Candelario, DO    Office Visit                 Passed - GFR  > 50     Lab Results   Component Value Date    GFRAA 102 09/20/2021

## 2022-08-04 ENCOUNTER — HOSPITAL ENCOUNTER (OUTPATIENT)
Age: 52
Discharge: HOME | End: 2022-08-04
Payer: COMMERCIAL

## 2022-08-04 VITALS
RESPIRATION RATE: 20 BRPM | OXYGEN SATURATION: 99 % | DIASTOLIC BLOOD PRESSURE: 89 MMHG | SYSTOLIC BLOOD PRESSURE: 117 MMHG | HEART RATE: 88 BPM | TEMPERATURE: 98 F

## 2022-08-04 DIAGNOSIS — M54.50 ACUTE RIGHT-SIDED LOW BACK PAIN WITHOUT SCIATICA: Primary | ICD-10-CM

## 2022-08-04 PROCEDURE — 99213 OFFICE O/P EST LOW 20 MIN: CPT | Performed by: NURSE PRACTITIONER

## 2022-08-04 RX ORDER — NAPROXEN 500 MG/1
500 TABLET ORAL 2 TIMES DAILY PRN
Qty: 20 TABLET | Refills: 0 | Status: SHIPPED | OUTPATIENT
Start: 2022-08-04 | End: 2022-08-14

## 2022-08-04 RX ORDER — LIDOCAINE 4 G/G
1 PATCH TOPICAL EVERY 24 HOURS
Qty: 7 PATCH | Refills: 0 | Status: SHIPPED | OUTPATIENT
Start: 2022-08-04 | End: 2022-08-11

## 2022-08-04 RX ORDER — METHOCARBAMOL 500 MG/1
500 TABLET, FILM COATED ORAL 3 TIMES DAILY PRN
Qty: 15 TABLET | Refills: 0 | Status: SHIPPED | OUTPATIENT
Start: 2022-08-04

## 2022-08-04 NOTE — ED INITIAL ASSESSMENT (HPI)
Pt presents with back pain x 2-3 days. Pt reports sleeping wrong and long drive may have caused pain. No fall or trauma. No pain medication taken today. Pt requesting work note, for work missed today.

## 2022-10-03 ENCOUNTER — LAB ENCOUNTER (OUTPATIENT)
Dept: LAB | Age: 52
End: 2022-10-03
Attending: FAMILY MEDICINE
Payer: COMMERCIAL

## 2022-10-03 ENCOUNTER — OFFICE VISIT (OUTPATIENT)
Dept: FAMILY MEDICINE CLINIC | Facility: CLINIC | Age: 52
End: 2022-10-03
Payer: COMMERCIAL

## 2022-10-03 VITALS
TEMPERATURE: 98 F | HEART RATE: 75 BPM | HEIGHT: 72 IN | SYSTOLIC BLOOD PRESSURE: 128 MMHG | DIASTOLIC BLOOD PRESSURE: 92 MMHG | WEIGHT: 196.5 LBS | BODY MASS INDEX: 26.61 KG/M2 | RESPIRATION RATE: 18 BRPM | OXYGEN SATURATION: 99 %

## 2022-10-03 DIAGNOSIS — I10 PRIMARY HYPERTENSION: ICD-10-CM

## 2022-10-03 DIAGNOSIS — Z12.11 COLON CANCER SCREENING: ICD-10-CM

## 2022-10-03 DIAGNOSIS — Z00.00 ENCOUNTER FOR SCREENING AND PREVENTATIVE CARE: ICD-10-CM

## 2022-10-03 DIAGNOSIS — Z00.00 ENCOUNTER FOR SCREENING AND PREVENTATIVE CARE: Primary | ICD-10-CM

## 2022-10-03 LAB
ALBUMIN SERPL-MCNC: 4.1 G/DL (ref 3.4–5)
ALBUMIN/GLOB SERPL: 1.1 {RATIO} (ref 1–2)
ALP LIVER SERPL-CCNC: 52 U/L
ALT SERPL-CCNC: 36 U/L
ANION GAP SERPL CALC-SCNC: 6 MMOL/L (ref 0–18)
AST SERPL-CCNC: 18 U/L (ref 15–37)
BASOPHILS # BLD AUTO: 0.03 X10(3) UL (ref 0–0.2)
BASOPHILS NFR BLD AUTO: 0.7 %
BILIRUB SERPL-MCNC: 0.6 MG/DL (ref 0.1–2)
BILIRUB UR QL: NEGATIVE
BUN BLD-MCNC: 18 MG/DL (ref 7–18)
BUN/CREAT SERPL: 16.5 (ref 10–20)
CALCIUM BLD-MCNC: 9.5 MG/DL (ref 8.5–10.1)
CHLORIDE SERPL-SCNC: 103 MMOL/L (ref 98–112)
CHOLEST SERPL-MCNC: 233 MG/DL (ref ?–200)
CLARITY UR: CLEAR
CO2 SERPL-SCNC: 29 MMOL/L (ref 21–32)
COLOR UR: YELLOW
CREAT BLD-MCNC: 1.09 MG/DL
DEPRECATED RDW RBC AUTO: 39 FL (ref 35.1–46.3)
EOSINOPHIL # BLD AUTO: 0.07 X10(3) UL (ref 0–0.7)
EOSINOPHIL NFR BLD AUTO: 1.6 %
ERYTHROCYTE [DISTWIDTH] IN BLOOD BY AUTOMATED COUNT: 12.6 % (ref 11–15)
FASTING PATIENT LIPID ANSWER: YES
FASTING STATUS PATIENT QL REPORTED: YES
GFR SERPLBLD BASED ON 1.73 SQ M-ARVRAT: 83 ML/MIN/1.73M2 (ref 60–?)
GLOBULIN PLAS-MCNC: 3.8 G/DL (ref 2.8–4.4)
GLUCOSE BLD-MCNC: 121 MG/DL (ref 70–99)
GLUCOSE UR-MCNC: NEGATIVE MG/DL
HCT VFR BLD AUTO: 49.2 %
HDLC SERPL-MCNC: 49 MG/DL (ref 40–59)
HGB BLD-MCNC: 15.7 G/DL
IMM GRANULOCYTES # BLD AUTO: 0 X10(3) UL (ref 0–1)
IMM GRANULOCYTES NFR BLD: 0 %
KETONES UR-MCNC: NEGATIVE MG/DL
LDLC SERPL CALC-MCNC: 160 MG/DL (ref ?–100)
LEUKOCYTE ESTERASE UR QL STRIP.AUTO: NEGATIVE
LYMPHOCYTES # BLD AUTO: 1.6 X10(3) UL (ref 1–4)
LYMPHOCYTES NFR BLD AUTO: 36.3 %
MCH RBC QN AUTO: 27.3 PG (ref 26–34)
MCHC RBC AUTO-ENTMCNC: 31.9 G/DL (ref 31–37)
MCV RBC AUTO: 85.4 FL
MONOCYTES # BLD AUTO: 0.43 X10(3) UL (ref 0.1–1)
MONOCYTES NFR BLD AUTO: 9.8 %
NEUTROPHILS # BLD AUTO: 2.28 X10 (3) UL (ref 1.5–7.7)
NEUTROPHILS # BLD AUTO: 2.28 X10(3) UL (ref 1.5–7.7)
NEUTROPHILS NFR BLD AUTO: 51.6 %
NITRITE UR QL STRIP.AUTO: NEGATIVE
NONHDLC SERPL-MCNC: 184 MG/DL (ref ?–130)
OSMOLALITY SERPL CALC.SUM OF ELEC: 289 MOSM/KG (ref 275–295)
PH UR: 6 [PH] (ref 5–8)
PLATELET # BLD AUTO: 221 10(3)UL (ref 150–450)
POTASSIUM SERPL-SCNC: 4.7 MMOL/L (ref 3.5–5.1)
PROT SERPL-MCNC: 7.9 G/DL (ref 6.4–8.2)
PROT UR-MCNC: NEGATIVE MG/DL
RBC # BLD AUTO: 5.76 X10(6)UL
SODIUM SERPL-SCNC: 138 MMOL/L (ref 136–145)
SP GR UR STRIP: 1.02 (ref 1–1.03)
TRIGL SERPL-MCNC: 135 MG/DL (ref 30–149)
TSI SER-ACNC: 1.14 MIU/ML (ref 0.36–3.74)
UROBILINOGEN UR STRIP-ACNC: 0.2
VLDLC SERPL CALC-MCNC: 26 MG/DL (ref 0–30)
WBC # BLD AUTO: 4.4 X10(3) UL (ref 4–11)

## 2022-10-03 PROCEDURE — 80061 LIPID PANEL: CPT

## 2022-10-03 PROCEDURE — 85025 COMPLETE CBC W/AUTO DIFF WBC: CPT

## 2022-10-03 PROCEDURE — 3080F DIAST BP >= 90 MM HG: CPT | Performed by: FAMILY MEDICINE

## 2022-10-03 PROCEDURE — 84443 ASSAY THYROID STIM HORMONE: CPT

## 2022-10-03 PROCEDURE — 3074F SYST BP LT 130 MM HG: CPT | Performed by: FAMILY MEDICINE

## 2022-10-03 PROCEDURE — 99396 PREV VISIT EST AGE 40-64: CPT | Performed by: FAMILY MEDICINE

## 2022-10-03 PROCEDURE — 81001 URINALYSIS AUTO W/SCOPE: CPT

## 2022-10-03 PROCEDURE — 80053 COMPREHEN METABOLIC PANEL: CPT

## 2022-10-03 PROCEDURE — 36415 COLL VENOUS BLD VENIPUNCTURE: CPT

## 2022-10-03 PROCEDURE — 3008F BODY MASS INDEX DOCD: CPT | Performed by: FAMILY MEDICINE

## 2022-10-03 PROCEDURE — 81015 MICROSCOPIC EXAM OF URINE: CPT

## 2022-10-05 DIAGNOSIS — R73.09 ELEVATED RANDOM BLOOD GLUCOSE LEVEL: Primary | ICD-10-CM

## 2022-10-18 DIAGNOSIS — I10 RESISTANT HYPERTENSION: ICD-10-CM

## 2022-10-18 RX ORDER — LOSARTAN POTASSIUM AND HYDROCHLOROTHIAZIDE 12.5; 5 MG/1; MG/1
1 TABLET ORAL DAILY
Qty: 90 TABLET | Refills: 1 | Status: SHIPPED | OUTPATIENT
Start: 2022-10-18

## 2022-11-19 DIAGNOSIS — I10 RESISTANT HYPERTENSION: ICD-10-CM

## 2022-11-19 RX ORDER — LOSARTAN POTASSIUM AND HYDROCHLOROTHIAZIDE 12.5; 5 MG/1; MG/1
1 TABLET ORAL DAILY
Qty: 90 TABLET | Refills: 1 | Status: SHIPPED | OUTPATIENT
Start: 2022-11-19

## 2022-11-19 NOTE — TELEPHONE ENCOUNTER
Please review. Protocol failed. Requested Prescriptions   Pending Prescriptions Disp Refills    losartan-hydroCHLOROthiazide 50-12.5 MG Oral Tab 90 tablet 1     Sig: Take 1 tablet by mouth daily.        Hypertensive Medications Protocol Failed - 11/19/2022  2:26 AM        Failed - Last BP reading less than 140/90     BP Readings from Last 1 Encounters:  10/03/22 : (!) 128/92              Passed - In person appointment in the past 12 or next 3 months     Recent Outpatient Visits              1 month ago Encounter for screening and preventative care    [address] [place], [address], [place], [place]    Office Visit    8 months ago Chest discomfort    [address] [place], [address], Андрей [place] DO    Office Visit    1 year ago Essential hypertension    [address] [place], [address], Андрей Wills DO    Office Visit    1 year ago Orthopedic aftercare    [facility], [address], [address] [facility]    Office Visit    1 year ago 100 [facility], [address], [address] [place], [place]    Office Visit          Future Appointments         Provider Department Appt Notes    In 10 months Jeaneth Arnold DO [address] [place], Am[place] ruggiero Annual physical               Passed - CMP or BMP in past 6 months     Recent Results (from the past 4392 hour(s))   COMP METABOLIC PANEL (14)    Collection Time: 10/03/22 10:33 AM   Result Value Ref Range    Glucose 121 (H) 70 - 99 mg/dL    Sodium 138 136 - 145 mmol/L    Potassium 4.7 3.5 - 5.1 mmol/L    Chloride 103 98 - 112 mmol/L    CO2 29.0 21.0 - 32.0 mmol/L    Anion Gap 6 0 - 18 mmol/L    BUN 18 7 - 18 mg/dL    Creatinine 1.09 0.70 - 1.30 mg/dL    BUN/CREA Ratio 16.5 10.0 - 20.0    Calcium, Total 9.5 8.5 - 10.1 mg/dL    Calculated Osmolality 289 275 - 295 mOsm/kg    eGFR-Cr 83 >=60 mL/min/1.73m2    ALT 36 16 - 61 U/L    AST 18 15 - 37 U/L    Alkaline Phosphatase 52 45 - 117 U/L    Bilirubin, Total 0.6 0.1 - 2.0 mg/dL    Total Protein 7.9 6.4 - 8.2 g/dL    Albumin 4.1 3.4 - 5.0 g/dL    Globulin  3.8 2.8 - 4.4 g/dL    A/G Ratio 1.1 1.0 - 2.0    Patient Fasting for CMP? Yes      *Note: Due to a large number of results and/or encounters for the requested time period, some results have not been displayed. A complete set of results can be found in Results Review.                Passed - In person appointment or virtual visit in the past 6 months     Recent Outpatient Visits              1 month ago Encounter for screening and preventative care    [facility], Michael Alcaraz, DO    Office Visit    8 months ago Chest discomfort    [address], [facility], [facility]    Office Visit    1 year ago Essential hypertension    [address] [facility] [place], [facility], [facility]    Office Visit    1 year ago Orthopedic aftercare    [facility], [address], [address] [facility], DPM    Office Visit    1 year ago 100 [facility], [address], [address] [facility], DPM    Office Visit          Future Appointments         Provider Department Appt Notes    In 10 months Chandrika Petty, [address] [place] [address], [address], [facility] Annual physical               Passed - [facility] or GFRAA > 50     GFR Evaluation  EGFRCR: 83 , resulted on 10/3/2022

## 2022-12-22 DIAGNOSIS — I10 RESISTANT HYPERTENSION: ICD-10-CM

## 2022-12-22 RX ORDER — LOSARTAN POTASSIUM AND HYDROCHLOROTHIAZIDE 12.5; 5 MG/1; MG/1
1 TABLET ORAL DAILY
Qty: 90 TABLET | Refills: 1 | Status: SHIPPED | OUTPATIENT
Start: 2022-12-22

## 2022-12-22 NOTE — TELEPHONE ENCOUNTER
Protocol failed or has No Protocol, please review  Requested Prescriptions   Pending Prescriptions Disp Refills    losartan-hydroCHLOROthiazide 50-12.5 MG Oral Tab 90 tablet 1     Sig: Take 1 tablet by mouth daily.        Hypertensive Medications Protocol Failed - 12/22/2022  6:57 AM        Failed - Last BP reading less than 140/90     BP Readings from Last 1 Encounters:  10/03/22 : (!) 128/92              Passed - In person appointment in the past 12 or next 3 months     Recent Outpatient Visits              2 months ago Encounter for screening and preventative care    [address] , [facility] Arlin Ramos, [place]    Office Visit    9 months ago Chest discomfort    [address], [facility] Arlin Ramos DO    Office Visit    1 year ago Essential hypertension    [address], Natanael Mazariegos, [facility] Arlin Ramos DO    Office Visit    1 year ago Orthopedic aftercare    [facility], [address], [address] [facility]    Office Visit    1 year ago 100 [facility], [address], [address] [place], [place]    Office Visit          Future Appointments         Provider Department Appt Notes    In 9 months Zeinab Cummins DO [address], [place] , [facility] Annual physical               Passed - CMP or BMP in past 6 months     Recent Results (from the past 4392 hour(s))   COMP METABOLIC PANEL (14)    Collection Time: 10/03/22 10:33 AM   Result Value Ref Range    Glucose 121 (H) 70 - 99 mg/dL    Sodium 138 136 - 145 mmol/L    Potassium 4.7 3.5 - 5.1 mmol/L    Chloride 103 98 - 112 mmol/L    CO2 29.0 21.0 - 32.0 mmol/L    Anion Gap 6 0 - 18 mmol/L    BUN 18 7 - 18 mg/dL    Creatinine 1.09 0.70 - 1.30 mg/dL    BUN/CREA Ratio 16.5 10.0 - 20.0    Calcium, Total 9.5 8.5 - 10.1 mg/dL    Calculated Osmolality 289 275 - 295 mOsm/kg    eGFR-Cr 83 >=60 mL/min/1.73m2    ALT 36 16 - 61 U/L    AST 18 15 - 37 U/L Alkaline Phosphatase 52 45 - 117 U/L    Bilirubin, Total 0.6 0.1 - 2.0 mg/dL    Total Protein 7.9 6.4 - 8.2 g/dL    Albumin 4.1 3.4 - 5.0 g/dL    Globulin  3.8 2.8 - 4.4 g/dL    A/G Ratio 1.1 1.0 - 2.0    Patient Fasting for CMP? Yes      *Note: Due to a large number of results and/or encounters for the requested time period, some results have not been displayed. A complete set of results can be found in Results Review.                Passed - In person appointment or virtual visit in the past 6 months     Recent Outpatient Visits              2 months ago Encounter for screening and preventative care    [address], [place] Tona [place], [place]    Office Visit    9 months ago Chest discomfort    [facility], [facility], [facility] , [facility], DO    Office Visit    1 year ago Essential hypertension    [facility], [facility] , [facility], DO    Office Visit    1 year ago Orthopedic aftercare    [facility], [address], [address] [facility], DPM    Office Visit    1 year ago 100 [facility], [address], [address] [facility], DPM    Office Visit          Future Appointments         Provider Department Appt Notes    In 9 months Brooklyn Candelario, [facility], [facility] [address] [place] [address] Annual physical               Passed - [facility] or GFRAA > 50     GFR Evaluation  EGFRCR: 83 , resulted on 10/3/2022             Future Appointments         Provider Department Appt Notes    In 9 months Brooklyn Candelario DO [facility], [facility] [address] [place] [address] Annual physical          Recent Outpatient Visits              2 months ago Encounter for screening and preventative care    [facility], [facility] , [facility], DO    Office Visit    9 months ago Chest discomfort    [facility], [facility] , [facility], DO    Office Visit    1 year ago Essential hypertension    [address] [place] [address] [address], [address], [place], Hayde Brar,     Office Visit    1 year ago Orthopedic aftercare    [facility], [address], [address] [place] [address] El[facility]    Office Visit    1 year ago [address], [address] [place] [address]    Office Visit

## 2023-01-31 DIAGNOSIS — I10 RESISTANT HYPERTENSION: ICD-10-CM

## 2023-02-01 DIAGNOSIS — I10 RESISTANT HYPERTENSION: ICD-10-CM

## 2023-02-01 RX ORDER — LOSARTAN POTASSIUM AND HYDROCHLOROTHIAZIDE 12.5; 5 MG/1; MG/1
1 TABLET ORAL DAILY
Qty: 90 TABLET | Refills: 1 | Status: CANCELLED | OUTPATIENT
Start: 2023-02-01

## 2023-02-03 RX ORDER — LOSARTAN POTASSIUM AND HYDROCHLOROTHIAZIDE 12.5; 5 MG/1; MG/1
1 TABLET ORAL DAILY
Qty: 90 TABLET | Refills: 1 | Status: SHIPPED | OUTPATIENT
Start: 2023-02-03

## 2023-02-03 NOTE — TELEPHONE ENCOUNTER
Please review. Protocol failed or has no protocol. Sending to Dr. Chris Sow as Dr. Christiane Delgado is out of office. Requested Prescriptions   Pending Prescriptions Disp Refills    losartan-hydroCHLOROthiazide 50-12.5 MG Oral Tab 90 tablet 1     Sig: Take 1 tablet by mouth daily.        Hypertensive Medications Protocol Failed - 1/31/2023  7:46 AM        Failed - Last BP reading less than 140/90     BP Readings from Last 1 Encounters:  10/03/22 : (!) 128/92              Passed - In person appointment in the past 12 or next 3 months     Recent Outpatient Visits              4 months ago Encounter for screening and preventative care    [facility] [place], [place]    Office Visit    11 months ago Chest discomfort    [facility], [facility],     Office Visit    1 year ago Essential hypertension    [address], [facility], DO    Office Visit    1 year ago Orthopedic aftercare    [facility], [facility]    Office Visit    1 year ago Pain    [facility], [place], [place]    Office Visit          Future Appointments         Provider Department Appt Notes    In 8 months Brooklyn Candelario,  [address], [facility], [employer] physical               Passed - CMP or BMP in past 6 months     Recent Results (from the past 4392 hour(s))   COMP METABOLIC PANEL (14)    Collection Time: 10/03/22 10:33 AM   Result Value Ref Range    Glucose 121 (H) 70 - 99 mg/dL    Sodium 138 136 - 145 mmol/L    Potassium 4.7 3.5 - 5.1 mmol/L    Chloride 103 98 - 112 mmol/L    CO2 29.0 21.0 - 32.0 mmol/L    Anion Gap 6 0 - 18 mmol/L    BUN 18 7 - 18 mg/dL    Creatinine 1.09 0.70 - 1.30 mg/dL    BUN/CREA Ratio 16.5 10.0 - 20.0    Calcium, Total 9.5 8.5 - 10.1 mg/dL    Calculated Osmolality 289 275 - 295 mOsm/kg    eGFR-Cr 83 >=60 mL/min/1.73m2    ALT 36 16 - 61 U/L    AST 18 15 - 37 U/L    Alkaline Phosphatase 52 45 - 117 U/L    Bilirubin, Total 0.6 0.1 - 2.0 mg/dL    Total Protein 7.9 6.4 - 8.2 g/dL    Albumin 4.1 3.4 - 5.0 g/dL    Globulin  3.8 2.8 - 4.4 g/dL    A/G Ratio 1.1 1.0 - 2.0    Patient Fasting for CMP? Yes      *Note: Due to a large number of results and/or encounters for the requested time period, some results have not been displayed. A complete set of results can be found in Results Review.                Passed - In person appointment or virtual visit in the past 6 months     Recent Outpatient Visits              4 months ago Encounter for screening and preventative care    [address] [place], [place] [address]alicia [place], [place]    Office Visit    11 months ago Chest discomfort    [facility], [place]io[place] [address] [place] [address] Suhas Soria DO    Office Visit    1 year ago Essential hypertension    [address], [address], [place]    Office Visit    1 year ago Orthopedic aftercare    [address] [place] [address] Tita Hu DPM    Office Visit    1 year ago [address], [address],[address], [place], [place]    Office Visit          Future Appointments         Provider Department Appt Notes    In 8 months Pearlene Rudder, DO [address], [facility], [place] [facility] physical               Passed - EGFRCR or GFRAA > 50     GFR Evaluation  EGFRCR: 83 , resulted on 10/3/2022               Recent Outpatient Visits              4 months ago Encounter for screening and preventative care    [address] [place] [address] [place], [place]    Office Visit    11 months ago Chest discomfort    [facility] , [facility] Suhas [place]oria [place]    Office Visit    1 year ago Essential hypertension    [facility], [facility], [place], Abbey Rodriguez, DO    Office Visit    1 year ago Orthopedic aftercare    [address] Diaz, [place] Krissy Mcclendon DPM    Office Visit    1 year ago [address], [facility],[address], [place] Joann [place], [place]    Office Visit            Future Appointments         Provider Department Appt Notes    In 8 months Dusty Dorado, DO [address], [facility], [place] [employer] physical

## 2023-04-17 DIAGNOSIS — I10 RESISTANT HYPERTENSION: ICD-10-CM

## 2023-04-17 RX ORDER — LOSARTAN POTASSIUM AND HYDROCHLOROTHIAZIDE 12.5; 5 MG/1; MG/1
1 TABLET ORAL DAILY
Qty: 90 TABLET | Refills: 1 | Status: CANCELLED | OUTPATIENT
Start: 2023-04-17

## 2023-04-18 RX ORDER — LOSARTAN POTASSIUM AND HYDROCHLOROTHIAZIDE 12.5; 5 MG/1; MG/1
1 TABLET ORAL DAILY
Qty: 90 TABLET | Refills: 1 | Status: SHIPPED | OUTPATIENT
Start: 2023-04-18

## 2023-04-18 NOTE — TELEPHONE ENCOUNTER
Protocol failed or has No Protocol, please review  Requested Prescriptions   Pending Prescriptions Disp Refills    losartan-hydroCHLOROthiazide 50-12.5 MG Oral Tab 90 tablet 1     Sig: Take 1 tablet by mouth daily. Hypertensive Medications Protocol Failed - 4/17/2023  1:27 PM        Failed - Last BP reading less than 140/90     BP Readings from Last 1 Encounters:  10/03/22 : (!) 128/92                Failed - CMP or BMP in past 6 months     No results found for this or any previous visit (from the past 4392 hour(s)).               Failed - In person appointment or virtual visit in the past 6 months     Recent Outpatient Visits              6 months ago Encounter for screening and preventative care    [address] [facility], [place], [place]    Office Visit    1 year ago Chest discomfort    [facility], [address], [facility],     Office Visit    1 year ago Essential hypertension    [address], [address], [facility] [place], DO    Office Visit    1 year ago Orthopedic aftercare    [address] [facility], [place] Shanice Maldonado, DPM    Office Visit    1 year ago [address], [address], [place] China Brito, [place]    Office Visit          Future Appointments         Provider Department Appt Notes    In 5 months Margarita Push, DO [address], [address], [place] Annual physical  10/3/22    In 8 months Nenaean Push, DO [address], [address], [place] Everything    In 8 months Margean Push, DO [address], [address], [facility] - In person appointment in the past 12 or next 3 months     Recent Outpatient Visits              6 months ago Encounter for screening and preventative care    [address], [address], [place] Magdalene Gonzales, DO    Office Visit    1 year ago Chest discomfort    [facility], [address], [place], [place]    Office Visit    1 year ago Essential hypertension    [address] [facility], [place], Ryne Reddy, DO    Office Visit    1 year ago Orthopedic aftercare    [address] [facility], [place], DPM    Office Visit    1 year ago [address], [address], [place], [place]    Office Visit          Future Appointments         Provider Department Appt Notes    In 5 months Towlashay Sanchezes, DO [address], [address], [place] Annual physical  10/3/22    In 8 months Towlashay Sanchezes, DO [address], [address], [place] Everything    In 8 months [facility] DO [address], [address], [place] Everything               Passed - [facility] or GFRAA > 50     GFR Evaluation  EGFRCR: 83 , resulted on 10/3/2022               Future Appointments         Provider Department Appt Notes    In 5 months Towana Gonzales, DO [address], [address], [place] Annual physical  10/3/22    In 8 months Towana Gonzales, DO [address], [address], [place] Everything    In 8 months Towlashay Gonzales, DO [address] [facility], [place] Everything          Recent Outpatient Visits              6 months ago Encounter for screening and preventative care    [address] [facility], [place], [place]    Office Visit    1 year ago Chest discomfort    [address], [address], [place] W[place], DO    Office Visit    1 year ago Essential hypertension    [address], [address], [place]    Office Visit    1 year ago Orthopedic aftercare Pierce Power, [place], [facility]    Office Visit    1 year ago [address], [address] [facility],[address], [place], [place]    Office Visit

## 2023-05-24 DIAGNOSIS — I10 RESISTANT HYPERTENSION: ICD-10-CM

## 2023-05-24 RX ORDER — LOSARTAN POTASSIUM AND HYDROCHLOROTHIAZIDE 12.5; 5 MG/1; MG/1
1 TABLET ORAL DAILY
Qty: 90 TABLET | Refills: 1 | Status: SHIPPED | OUTPATIENT
Start: 2023-05-24

## 2023-05-24 NOTE — TELEPHONE ENCOUNTER
Please review. Protocol failed / Has no protocol. Requested Prescriptions   Pending Prescriptions Disp Refills    losartan-hydroCHLOROthiazide 50-12.5 MG Oral Tab 90 tablet 1     Sig: Take 1 tablet by mouth daily. Hypertensive Medications Protocol Failed - 5/24/2023  8:04 AM        Failed - Last BP reading less than 140/90     BP Readings from Last 1 Encounters:  10/03/22 : (!) 128/92                Failed - CMP or BMP in past 6 months     No results found for this or any previous visit (from the past 4392 hour(s)).               Failed - In person appointment or virtual visit in the past 6 months     Recent Outpatient Visits              7 months ago Encounter for screening and preventative care    [address] [facility], [place], [facility], [place]    Office Visit    1 year ago Chest discomfort    [facility], [address], [facility] Ragini Taylor DO    Office Visit    1 year ago Essential hypertension    [address], [address], [facility] Ragini Taylor DO    Office Visit    1 year ago Orthopedic aftercare    [address] [facility], [place] Davy Maloney DPM    Office Visit    2 years ago [address], [address], [place], [place]    Office Visit          Future Appointments         Provider Department Appt Notes    In 4 months Manohar Caves, DO [address], [address], [place] Annual physical  10/3/22    In 7 months Manohar Caves, DO [address], [address], [place] Everything    In 7 months Manohar Caves, DO [address], [address], [address] [facility] [address] - In person appointment in the past 12 or next 3 months     Recent Outpatient Visits              7 months ago Encounter for screening and preventative care    [address], [address], [place] Zeinab Cummins, DO    Office Visit    1 year ago Chest discomfort    [facility], [address], [place], Arlin Ramos, [place]    Office Visit    1 year ago Essential hypertension    Johann Ervin Editha Buckles, DO    Office Visit    1 year ago Orthopedic aftercare    Donal Ervinmhmartine [place], SARAH BETHM    Office Visit    2 years ago [facility], [address], [facility] chi[facility] [place], [place]    Office Visit          Future Appointments         Provider Department Appt Notes    In 4 months Zeinab Soho, DO [address], [address], [address] Annual physical  10/3/22    In 7 months Zeinab Soho, DO [address], [address], [address] Everything    In 7 months Zeinab Soho, DO [address], [address], [address] Everything               Passed - [facility] or GFRAA > 50     GFR Evaluation  EGFRCR: 83 , resulted on 10/3/2022               Future Appointments         Provider Department Appt Notes    In 4 months Zeinab Soho, DO [address], [address], [address] Annual physical  10/3/22    In 7 months Zeinab Soho, DO [address], [address], [address] Everything    In 7 months Zeinab Soho, DO Nichelle Bolton, [address] Everything           Recent Outpatient Visits              7 months ago Encounter for screening and preventative care    Johann Ervin, Arlin Ramos [facility]    Office Visit    1 year ago Chest discomfort    [address], [address], [place]    Office Visit    1 year ago Essential hypertension    [address], [address], [place]    Office Visit    1 year ago Orthopedic aftercare [address] [facility], Lynda Mattson, [facility]    Office Visit    2 years ago [facility], [address] [facility],[address], Lynda Matt[place], [place]    Office Visit

## 2023-06-24 DIAGNOSIS — I10 RESISTANT HYPERTENSION: ICD-10-CM

## 2023-06-26 RX ORDER — LOSARTAN POTASSIUM AND HYDROCHLOROTHIAZIDE 12.5; 5 MG/1; MG/1
1 TABLET ORAL DAILY
Qty: 90 TABLET | Refills: 1 | Status: SHIPPED | OUTPATIENT
Start: 2023-06-26

## 2023-06-26 NOTE — TELEPHONE ENCOUNTER
Please review. Protocol failed / Has no protocol. Future Appointments   Date Time Provider Cecilio Qureshi   10/3/2023  9:40 AM Lisa Husbands, DO ECOPOMATILDE [facility]   12/26/2023  9:40 AM Lisa Husbands, DO [facility]   12/29/2023  8:00 AM Lisa Husbands, DO [facility]   No Active/ Future labs currently pended     Requested Prescriptions   Pending Prescriptions Disp Refills    losartan-hydroCHLOROthiazide 50-12.5 MG Oral Tab 90 tablet 1     Sig: Take 1 tablet by mouth daily. Hypertensive Medications Protocol Failed - 6/24/2023  7:52 PM        Failed - Last BP reading less than 140/90     BP Readings from Last 1 Encounters:  10/03/22 : (!) 128/92              Failed - CMP or BMP in past 6 months     No results found for this or any previous visit (from the past 4392 hour(s)).             Failed - In person appointment or virtual visit in the past 6 months     Recent Outpatient Visits              8 months ago Encounter for screening and preventative care    [id number] [place] [address] Lisandra Patiño [place]    Office Visit    1 year ago Chest discomfort    [address] [facility] Lisandra Patiño DO    Office Visit    1 year ago Essential hypertension    [address], [address], [facility] Lisandra Patiño DO    Office Visit    2 years ago Orthopedic aftercare    [address] [facility], [place] Vannessa Diaz DPM    Office Visit    2 years ago [facility], [address] [facility],[address], [place] AlishaHedy Sandhoff, [place]    Office Visit          Future Appointments         Provider Department Appt Notes    In 3 months Lisa Husbands, DO [address], [address], [place] Annual physical  10/3/22    In 6 months Lisa Husbands, DO [address], [address], [place] Everything    In 6 months Lisa Husbands, DO [address] [facility] Everything               Passed - In person appointment in the past 12 or next 3 months     Recent Outpatient Visits              8 months ago Encounter for screening and preventative care    [address] [place] [address]    Office Visit    1 year ago Chest discomfort    [facility], [address], [place], [place], DO    Office Visit    1 year ago Essential hypertension    [address], [address], [place], [place], DO    Office Visit    2 years ago Orthopedic aftercare    [address] [facility] Krissy Mcclendon, DPM    Office Visit    2 years ago [address] [facility] Joann Brito, [place]    Office Visit          Future Appointments         Provider Department Appt Notes    In 3 months Dusty Dorado DO [address], [address], [facility] Annual physical  10/3/22    In 6 months Dusty Dorado DO [address], [address], [facility] Everything    In 6 months Dusty Dorado, DO [address], [address], [facility] Everything               Passed - [facility] or GFRAA > 50     GFR Evaluation  EGFRCR: 83 , resulted on 10/3/2022             Future Appointments         Provider Department Appt Notes    In 3 months Dusty Dorado DO [address], [address], [facility] Annual physical  10/3/22    In 6 months Dusty Dorado DO [address], [address], [facility] Everything    In 6 months Stewart Johnson [place], DO [address], [address], [facility] Everything           Recent Outpatient Visits              8 months ago Encounter for screening and preventative care    [address] [place] [address]    Office Visit    1 year ago Chest discomfort    [facility], [address], [place], Ragini Taylor, [place]    Office Visit    1 year ago Essential hypertension    [address], [address], [place], Ragini Taylor,     Office Visit    2 years ago Orthopedic aftercare    [address] [facility], [place], DPM    Office Visit    2 years ago [address], [address], [place], [place]    Office Visit

## 2023-07-31 DIAGNOSIS — I10 RESISTANT HYPERTENSION: ICD-10-CM

## 2023-08-01 DIAGNOSIS — I10 RESISTANT HYPERTENSION: ICD-10-CM

## 2023-08-01 RX ORDER — LOSARTAN POTASSIUM AND HYDROCHLOROTHIAZIDE 12.5; 5 MG/1; MG/1
1 TABLET ORAL DAILY
Qty: 90 TABLET | Refills: 1 | OUTPATIENT
Start: 2023-08-01

## 2023-10-03 ENCOUNTER — LAB ENCOUNTER (OUTPATIENT)
Dept: LAB | Age: 53
End: 2023-10-03
Attending: FAMILY MEDICINE
Payer: COMMERCIAL

## 2023-10-03 ENCOUNTER — OFFICE VISIT (OUTPATIENT)
Dept: FAMILY MEDICINE CLINIC | Facility: CLINIC | Age: 53
End: 2023-10-03

## 2023-10-03 VITALS
WEIGHT: 205.38 LBS | HEIGHT: 72 IN | HEART RATE: 73 BPM | SYSTOLIC BLOOD PRESSURE: 140 MMHG | OXYGEN SATURATION: 99 % | BODY MASS INDEX: 27.82 KG/M2 | TEMPERATURE: 98 F | DIASTOLIC BLOOD PRESSURE: 100 MMHG

## 2023-10-03 DIAGNOSIS — Z28.21 HERPES ZOSTER VACCINATION DECLINED: ICD-10-CM

## 2023-10-03 DIAGNOSIS — Z11.3 ROUTINE SCREENING FOR STI (SEXUALLY TRANSMITTED INFECTION): ICD-10-CM

## 2023-10-03 DIAGNOSIS — I10 PRIMARY HYPERTENSION: Primary | ICD-10-CM

## 2023-10-03 DIAGNOSIS — Z00.00 ROUTINE PHYSICAL EXAMINATION: ICD-10-CM

## 2023-10-03 DIAGNOSIS — I11.9 LVH (LEFT VENTRICULAR HYPERTROPHY) DUE TO HYPERTENSIVE DISEASE, WITHOUT HEART FAILURE: ICD-10-CM

## 2023-10-03 DIAGNOSIS — R73.09 ELEVATED RANDOM BLOOD GLUCOSE LEVEL: ICD-10-CM

## 2023-10-03 DIAGNOSIS — Z12.11 COLON CANCER SCREENING: ICD-10-CM

## 2023-10-03 DIAGNOSIS — Z23 NEED FOR DIPHTHERIA-TETANUS-PERTUSSIS (TDAP) VACCINE: ICD-10-CM

## 2023-10-03 LAB
ALBUMIN SERPL-MCNC: 3.9 G/DL (ref 3.4–5)
ALBUMIN/GLOB SERPL: 0.9 {RATIO} (ref 1–2)
ALP LIVER SERPL-CCNC: 55 U/L
ALT SERPL-CCNC: 40 U/L
ANION GAP SERPL CALC-SCNC: 10 MMOL/L (ref 0–18)
AST SERPL-CCNC: 25 U/L (ref 15–37)
BASOPHILS # BLD AUTO: 0.04 X10(3) UL (ref 0–0.2)
BASOPHILS NFR BLD AUTO: 0.7 %
BILIRUB SERPL-MCNC: 0.7 MG/DL (ref 0.1–2)
BILIRUB UR QL: NEGATIVE
BUN BLD-MCNC: 16 MG/DL (ref 7–18)
BUN/CREAT SERPL: 13.8 (ref 10–20)
CALCIUM BLD-MCNC: 9.8 MG/DL (ref 8.5–10.1)
CHLORIDE SERPL-SCNC: 104 MMOL/L (ref 98–112)
CHOLEST SERPL-MCNC: 220 MG/DL (ref ?–200)
CLARITY UR: CLEAR
CO2 SERPL-SCNC: 23 MMOL/L (ref 21–32)
COLOR UR: YELLOW
COMPLEXED PSA SERPL-MCNC: 1.87 NG/ML (ref ?–4)
CREAT BLD-MCNC: 1.16 MG/DL
DEPRECATED RDW RBC AUTO: 39.7 FL (ref 35.1–46.3)
EGFRCR SERPLBLD CKD-EPI 2021: 76 ML/MIN/1.73M2 (ref 60–?)
EOSINOPHIL # BLD AUTO: 0.05 X10(3) UL (ref 0–0.7)
EOSINOPHIL NFR BLD AUTO: 0.9 %
ERYTHROCYTE [DISTWIDTH] IN BLOOD BY AUTOMATED COUNT: 13.2 % (ref 11–15)
EST. AVERAGE GLUCOSE BLD GHB EST-MCNC: 114 MG/DL (ref 68–126)
FASTING PATIENT LIPID ANSWER: YES
FASTING STATUS PATIENT QL REPORTED: YES
GLOBULIN PLAS-MCNC: 4.5 G/DL (ref 2.8–4.4)
GLUCOSE BLD-MCNC: 110 MG/DL (ref 70–99)
GLUCOSE UR-MCNC: NORMAL MG/DL
HAV AB SER QL IA: NONREACTIVE
HBA1C MFR BLD: 5.6 % (ref ?–5.7)
HBV CORE AB SERPL QL IA: NONREACTIVE
HBV SURFACE AB SER QL: NONREACTIVE
HBV SURFACE AB SERPL IA-ACNC: <3.1 MIU/ML
HBV SURFACE AG SERPL QL IA: NONREACTIVE
HCT VFR BLD AUTO: 48.3 %
HCV AB SERPL QL IA: NONREACTIVE
HDLC SERPL-MCNC: 52 MG/DL (ref 40–59)
HGB BLD-MCNC: 16.2 G/DL
HGB UR QL STRIP.AUTO: NEGATIVE
IMM GRANULOCYTES # BLD AUTO: 0.02 X10(3) UL (ref 0–1)
IMM GRANULOCYTES NFR BLD: 0.3 %
KETONES UR-MCNC: NEGATIVE MG/DL
LDLC SERPL CALC-MCNC: 120 MG/DL (ref ?–100)
LEUKOCYTE ESTERASE UR QL STRIP.AUTO: NEGATIVE
LYMPHOCYTES # BLD AUTO: 1.92 X10(3) UL (ref 1–4)
LYMPHOCYTES NFR BLD AUTO: 33 %
MCH RBC QN AUTO: 27.8 PG (ref 26–34)
MCHC RBC AUTO-ENTMCNC: 33.5 G/DL (ref 31–37)
MCV RBC AUTO: 83 FL
MONOCYTES # BLD AUTO: 0.5 X10(3) UL (ref 0.1–1)
MONOCYTES NFR BLD AUTO: 8.6 %
NEUTROPHILS # BLD AUTO: 3.28 X10 (3) UL (ref 1.5–7.7)
NEUTROPHILS # BLD AUTO: 3.28 X10(3) UL (ref 1.5–7.7)
NEUTROPHILS NFR BLD AUTO: 56.5 %
NITRITE UR QL STRIP.AUTO: NEGATIVE
NONHDLC SERPL-MCNC: 168 MG/DL (ref ?–130)
OSMOLALITY SERPL CALC.SUM OF ELEC: 286 MOSM/KG (ref 275–295)
PH UR: 5 [PH] (ref 5–8)
PLATELET # BLD AUTO: 256 10(3)UL (ref 150–450)
POTASSIUM SERPL-SCNC: 4.1 MMOL/L (ref 3.5–5.1)
PROT SERPL-MCNC: 8.4 G/DL (ref 6.4–8.2)
PROT UR-MCNC: NEGATIVE MG/DL
RBC # BLD AUTO: 5.82 X10(6)UL
SODIUM SERPL-SCNC: 137 MMOL/L (ref 136–145)
SP GR UR STRIP: 1.01 (ref 1–1.03)
TRIGL SERPL-MCNC: 273 MG/DL (ref 30–149)
TSI SER-ACNC: 1.77 MIU/ML (ref 0.36–3.74)
UROBILINOGEN UR STRIP-ACNC: NORMAL
VLDLC SERPL CALC-MCNC: 49 MG/DL (ref 0–30)
WBC # BLD AUTO: 5.8 X10(3) UL (ref 4–11)

## 2023-10-03 PROCEDURE — 81003 URINALYSIS AUTO W/O SCOPE: CPT

## 2023-10-03 PROCEDURE — 86708 HEPATITIS A ANTIBODY: CPT

## 2023-10-03 PROCEDURE — 90715 TDAP VACCINE 7 YRS/> IM: CPT | Performed by: FAMILY MEDICINE

## 2023-10-03 PROCEDURE — 36415 COLL VENOUS BLD VENIPUNCTURE: CPT

## 2023-10-03 PROCEDURE — 86704 HEP B CORE ANTIBODY TOTAL: CPT

## 2023-10-03 PROCEDURE — 3077F SYST BP >= 140 MM HG: CPT | Performed by: FAMILY MEDICINE

## 2023-10-03 PROCEDURE — 3008F BODY MASS INDEX DOCD: CPT | Performed by: FAMILY MEDICINE

## 2023-10-03 PROCEDURE — 3080F DIAST BP >= 90 MM HG: CPT | Performed by: FAMILY MEDICINE

## 2023-10-03 PROCEDURE — 80053 COMPREHEN METABOLIC PANEL: CPT

## 2023-10-03 PROCEDURE — 87491 CHLMYD TRACH DNA AMP PROBE: CPT

## 2023-10-03 PROCEDURE — 87389 HIV-1 AG W/HIV-1&-2 AB AG IA: CPT

## 2023-10-03 PROCEDURE — 99396 PREV VISIT EST AGE 40-64: CPT | Performed by: FAMILY MEDICINE

## 2023-10-03 PROCEDURE — 80503 PATH CLIN CONSLTJ SF 5-20: CPT

## 2023-10-03 PROCEDURE — 85025 COMPLETE CBC W/AUTO DIFF WBC: CPT

## 2023-10-03 PROCEDURE — 86706 HEP B SURFACE ANTIBODY: CPT

## 2023-10-03 PROCEDURE — 80061 LIPID PANEL: CPT

## 2023-10-03 PROCEDURE — 86780 TREPONEMA PALLIDUM: CPT

## 2023-10-03 PROCEDURE — 86803 HEPATITIS C AB TEST: CPT

## 2023-10-03 PROCEDURE — 87591 N.GONORRHOEAE DNA AMP PROB: CPT

## 2023-10-03 PROCEDURE — 83036 HEMOGLOBIN GLYCOSYLATED A1C: CPT

## 2023-10-03 PROCEDURE — 87340 HEPATITIS B SURFACE AG IA: CPT

## 2023-10-03 PROCEDURE — 90471 IMMUNIZATION ADMIN: CPT | Performed by: FAMILY MEDICINE

## 2023-10-03 PROCEDURE — 84443 ASSAY THYROID STIM HORMONE: CPT

## 2023-10-03 NOTE — PROGRESS NOTES
Subjective:     Patient ID: Lisseth Ambrocio is a 48year old male. 48year old hypertensive AA male here for complete preventive care physical and for status update on any confirmed chronic medical illnesses and follow up on any previous labs or procedures that were suggestive or in need of further work up. Colonoscopy is due. Bowel, bladder, and sexual functions are intact. Patient continues to show elevated blood pressures with documented LVH without complaint of chest pain, headaches, dizziness, shortness of breath, visual changes, and/or exertional fatigue. Tdap offered and patient consents to have it done. No known contact/asymptomatic STI screening requested. History/Other:   Review of Systems  Current Outpatient Medications   Medication Sig Dispense Refill    losartan-hydroCHLOROthiazide 50-12.5 MG Oral Tab Take 1 tablet by mouth daily. 90 tablet 1    methocarbamol 500 MG Oral Tab Take 1 tablet (500 mg total) by mouth 3 (three) times daily as needed (for back pain). (Patient not taking: Reported on 10/3/2022) 15 tablet 0    Vardenafil HCl (LEVITRA) 20 MG Oral Tab Take 1 tablet (20 mg total) by mouth daily as needed for Erectile Dysfunction.  (Patient not taking: Reported on 10/3/2022) 8 tablet 2     Allergies:No Known Allergies    Past Medical History:   Diagnosis Date    Esophageal reflux     Essential hypertension     High cholesterol     Plantar fascial fibromatosis of both feet       Past Surgical History:   Procedure Laterality Date    COLONOSCOPY N/A 10/28/2017    Procedure: COLONOSCOPY;  Surgeon: Dinora Quinonez MD;  Location: [address] [place] [address] ENDOSCOPY    ELECTROCARDIOGRAM, COMPLETE  9-5-2013    SCANNED TO MEDIA TAB      Family History   Problem Relation Age of Onset    Cancer Father         lung      Social History:   Social History     Socioeconomic History    Marital status:    Tobacco Use    Smoking status: Never    Smokeless tobacco: Never   Vaping Use    Vaping Use: Never used   Substance and Sexual Activity    Alcohol use: Yes     Alcohol/week: 3.3 standard drinks of alcohol     Types: 2 Cans of beer, 2 Shots of liquor per week     Comment: weekly    Drug use: No   Other Topics Concern    Caffeine Concern Yes     Comment: soda        Objective:    10/03/23  1055   BP: (!) 140/100   Pulse:    Temp:        Physical Exam  Constitutional:       General: He is not in acute distress. Appearance: Normal appearance. He is not ill-appearing. HENT:      Head: Normocephalic and atraumatic. Right Ear: Tympanic membrane normal.      Left Ear: Tympanic membrane normal.      Nose: Nose normal.      Mouth/Throat:      Mouth: Mucous membranes are moist.   Cardiovascular:      Rate and Rhythm: Normal rate and regular rhythm. Heart sounds: Normal heart sounds. Pulmonary:      Effort: Pulmonary effort is normal.      Breath sounds: Normal breath sounds. Abdominal:      General: There is no distension. Palpations: Abdomen is soft. There is no mass. Neurological:      Mental Status: He is alert and oriented to person, place, and time. Psychiatric:         Mood and Affect: Mood normal.         Assessment & Plan:   1. Routine physical examination  General well exam and the following has been ordered. - CBC With Differential With Platelet; Future  - Comp Metabolic Panel (14); Future  - Lipid Panel; Future  - PSA Total, Screen; Future  - TSH W Reflex To Free T4; Future  - Urinalysis, Routine; Future    2. Primary hypertension  Blood pressure not measuring to goal.  Goal is to be consistently less than 140/90. Antihypertensive just.  - Cardio Referral - Internal    3. Herpes zoster vaccination declined  Declined. 4. Colon cancer screening  Referred. - Gastro Referral - In Network    5. Need for diphtheria-tetanus-pertussis (Tdap) vaccine  Ordered and administered on today. 6. Routine screening for STI (sexually transmitted infection)  Asymptomatic. No known contacts. Screening.  - HIV AG AB Combo [E]; Future  - T Pallidum Screening Cascade; Future  - Hepatitis A B + C profile [E]; Future  - Chlamydia/Gc Amplification; Future    7. LVH (left ventricular hypertrophy) due to hypertensive disease, without heart failure  Referred to cardiology. - Cardio Referral - Internal        Orders Placed This Encounter      CBC With Differential With Platelet      Comp Metabolic Panel (14)      Lipid Panel      PSA Total, Screen      TSH W Reflex To Free T4      Urinalysis, Routine      TETANUS, DIPHTHERIA TOXOIDS AND ACELLULAR PERTUSIS VACCINE (TDAP), >7 YEARS, IM USE      Meds This Visit:  Requested Prescriptions      No prescriptions requested or ordered in this encounter       Imaging & Referrals:  TETANUS, DIPHTHERIA TOXOIDS AND ACELLULAR PERTUSIS VACCINE (TDAP), >7 YEARS, IM USE  GASTRO - INTERNAL     Patient Instructions   Increase medication to double the current dosage. Encouraged physical fitness and daily physical activity daily. Medication reviewed and renewed where needed and appropriate. Comply with medications. Encouraged safe physical fitness and daily physical activity daily. Return in about 6 weeks (around 11/14/2023), or if symptoms worsen or fail to improve.

## 2023-10-03 NOTE — PATIENT INSTRUCTIONS
Increase medication to double the current dosage. Encouraged physical fitness and daily physical activity daily. Medication reviewed and renewed where needed and appropriate. Comply with medications. Encouraged safe physical fitness and daily physical activity daily.

## 2023-11-05 DIAGNOSIS — I1A.0 RESISTANT HYPERTENSION: ICD-10-CM

## 2023-11-06 NOTE — TELEPHONE ENCOUNTER
Please review; protocol failed. Requested Prescriptions   Pending Prescriptions Disp Refills    LOSARTAN-HYDROCHLOROTHIAZIDE 50-12.5 MG Oral Tab [Pharmacy Med Name: LOSARTAN/HCTZ 50/12.5MG TABLETS] 90 tablet 1     Sig: Take 1 tablet by mouth daily.        Hypertensive Medications Protocol Failed - 11/5/2023  9:36 AM        Failed - Last BP reading less than 140/90     BP Readings from Last 1 Encounters:  10/03/23 : (!) 140/100              Passed - In person appointment in the past 12 or next 3 months     Recent Outpatient Visits              1 month ago Primary hypertension    [facility], [place] [address], [facility],     Office Visit    1 year ago Encounter for screening and preventative care    [address] [place] [address],     Office Visit    1 year ago Chest discomfort    [facility], [facility], [facility],     Office Visit    2 years ago Essential hypertension    [address], [employer], [facility],     Office Visit    2 years ago Orthopedic aftercare    [address] [place] [address], [place] Eddie Brito, DPGLEN    Office Visit          Future Appointments         Provider Department Appt Notes    In 1 month Yunior Ritchie DO [address], [employer], [facility] Follow up    In 11 months Demetria Lou, [place] [address] [address] [facility], [facility] annual physical                      Passed - CMP or BMP in past 6 months     Recent Results (from the past 4392 hour(s))   Comp Metabolic Panel (14)    Collection Time: 10/03/23 11:20 AM   Result Value Ref Range    Glucose 110 (H) 70 - 99 mg/dL    Sodium 137 136 - 145 mmol/L    Potassium 4.1 3.5 - 5.1 mmol/L    Chloride 104 98 - 112 mmol/L    CO2 23.0 21.0 - 32.0 mmol/L    Anion Gap 10 0 - 18 mmol/L    BUN 16 7 - 18 mg/dL    Creatinine 1.16 0.70 - 1.30 mg/dL    BUN/CREA Ratio 13.8 10.0 - 20.0    Calcium, Total 9.8 8.5 - 10.1 mg/dL    Calculated Osmolality 286 275 - 295 mOsm/kg    eGFR-Cr 76 >=60 mL/min/1.73m2    ALT 40 16 - 61 U/L    AST 25 15 - 37 U/L    Alkaline Phosphatase 55 45 - 117 U/L    Bilirubin, Total 0.7 0.1 - 2.0 mg/dL    Total Protein 8.4 (H) 6.4 - 8.2 g/dL    Albumin 3.9 3.4 - 5.0 g/dL    Globulin  4.5 (H) 2.8 - 4.4 g/dL    A/G Ratio 0.9 (L) 1.0 - 2.0    Patient Fasting for CMP? Yes      *Note: Due to a large number of results and/or encounters for the requested time period, some results have not been displayed. A complete set of results can be found in Results Review.                Passed - In person appointment or virtual visit in the past 6 months     Recent Outpatient Visits              1 month ago Primary hypertension    [facility], [address]    Office Visit    1 year ago Encounter for screening and preventative care    [facility]    Office Visit    1 year ago Chest discomfort    [facility] [address] [place] [address],     Office Visit    2 years ago Essential hypertension    [facility],     Office Visit    2 years ago Orthopedic aftercare    Coni Evans [place], [place]    Office Visit          Future Appointments         Provider Department Appt Notes    In 1 month DO Coni Montemayor [address] Follow up    In 11 months DO Mago Montemayor [address], [address] annual physical                      Passed - [place] or GFRAA > 50     GFR Evaluation  EGFRCR: 76 , resulted on 10/3/2023               Future Appointments         Provider Department Appt Notes    In 1 month Evelyn Michael DO [facility] Follow up    In 11 months Taurus Gage DO [address] [facility] annual physical            Recent Outpatient Visits              1 month ago Primary hypertension    [facility], [address], [place], T[place] Bent[place],     Office Visit    1 year ago Encounter for screening and preventative care    [address] [place] [address], [place], Taurus Bentley,     Office Visit    1 year ago Chest discomfort    [address], [address], [place],     Office Visit    2 years ago Essential hypertension    [address], [address], [place], Taurus Bentley,     Office Visit    2 years ago Orthopedic aftercare    [address] [place] [address], Cornel[place] Edie Britosof[place], [place]    Office Visit

## 2023-11-07 RX ORDER — LOSARTAN POTASSIUM AND HYDROCHLOROTHIAZIDE 12.5; 5 MG/1; MG/1
1 TABLET ORAL DAILY
Qty: 90 TABLET | Refills: 1 | Status: SHIPPED | OUTPATIENT
Start: 2023-11-07

## 2023-11-24 ENCOUNTER — HOSPITAL ENCOUNTER (OUTPATIENT)
Age: 53
Discharge: HOME | End: 2023-11-24
Payer: COMMERCIAL

## 2023-11-24 VITALS
SYSTOLIC BLOOD PRESSURE: 143 MMHG | OXYGEN SATURATION: 99 % | RESPIRATION RATE: 20 BRPM | TEMPERATURE: 98 F | HEART RATE: 99 BPM | DIASTOLIC BLOOD PRESSURE: 90 MMHG

## 2023-11-24 DIAGNOSIS — R05.9 COUGH: ICD-10-CM

## 2023-11-24 DIAGNOSIS — J01.00 ACUTE NON-RECURRENT MAXILLARY SINUSITIS: Primary | ICD-10-CM

## 2023-11-24 LAB — SARS-COV-2 RNA RESP QL NAA+PROBE: NOT DETECTED

## 2023-11-24 RX ORDER — AMOXICILLIN AND CLAVULANATE POTASSIUM 875; 125 MG/1; MG/1
1 TABLET, FILM COATED ORAL 2 TIMES DAILY
Qty: 14 TABLET | Refills: 0 | Status: SHIPPED | OUTPATIENT
Start: 2023-11-24 | End: 2023-12-01

## 2023-11-24 NOTE — ED INITIAL ASSESSMENT (HPI)
Pt with cough and congestion x2 wks and now with right-sided sinus pressure that radiates to jaw; denies fever

## 2023-11-24 NOTE — DISCHARGE INSTRUCTIONS
You are negative for COVID-19. As discussed we will treat you for suspected sinus infection with antibiotics twice a day for 7 days. Take with food and water. Sleep somewhat elevated upright. Sleep with humidifier. Steam showers for cough and congestion. You may continue with over-the-counter cold medication. Recommend Flonase and Sudafed for nasal decongestion. Please drink plenty of water and get plenty of rest    Please go to ER if you have any chest pain, dizziness, lightheadedness, palpitations, shortness of breath.

## 2024-04-04 PROCEDURE — 93010 ELECTROCARDIOGRAM REPORT: CPT

## 2024-04-04 PROCEDURE — 99284 EMERGENCY DEPT VISIT MOD MDM: CPT

## 2024-04-04 PROCEDURE — 93005 ELECTROCARDIOGRAM TRACING: CPT

## 2024-04-05 ENCOUNTER — HOSPITAL ENCOUNTER (EMERGENCY)
Facility: HOSPITAL | Age: 54
Discharge: HOME | End: 2024-04-05
Attending: EMERGENCY MEDICINE
Payer: COMMERCIAL

## 2024-04-05 VITALS
DIASTOLIC BLOOD PRESSURE: 88 MMHG | HEIGHT: 72 IN | OXYGEN SATURATION: 96 % | RESPIRATION RATE: 18 BRPM | SYSTOLIC BLOOD PRESSURE: 132 MMHG | TEMPERATURE: 99 F | BODY MASS INDEX: 26.55 KG/M2 | WEIGHT: 196 LBS | HEART RATE: 107 BPM

## 2024-04-05 DIAGNOSIS — J11.1 INFLUENZA: Primary | ICD-10-CM

## 2024-04-05 LAB
ATRIAL RATE: 123 BPM
FLUAV + FLUBV RNA SPEC NAA+PROBE: NEGATIVE
FLUAV + FLUBV RNA SPEC NAA+PROBE: POSITIVE
P AXIS: 32 DEGREES
P-R INTERVAL: 128 MS
Q-T INTERVAL: 308 MS
QRS DURATION: 82 MS
QTC CALCULATION (BEZET): 440 MS
R AXIS: -35 DEGREES
RSV RNA SPEC NAA+PROBE: NEGATIVE
SARS-COV-2 RNA RESP QL NAA+PROBE: NOT DETECTED
T AXIS: 24 DEGREES
VENTRICULAR RATE: 123 BPM

## 2024-04-05 PROCEDURE — 0241U SARS-COV-2/FLU A AND B/RSV BY PCR (GENEXPERT): CPT | Performed by: EMERGENCY MEDICINE

## 2024-04-05 PROCEDURE — 0241U SARS-COV-2/FLU A AND B/RSV BY PCR (GENEXPERT): CPT

## 2024-04-05 RX ORDER — IBUPROFEN 600 MG/1
600 TABLET ORAL ONCE
Status: COMPLETED | OUTPATIENT
Start: 2024-04-05 | End: 2024-04-05

## 2024-04-05 NOTE — ED PROVIDER NOTES
Patient Seen in: [facility] Emergency Department      History   No chief complaint on file.    Stated Complaint: Chest Pain    Subjective:   HPI    Patient is a 55-year-old man that states he is feeling achy last couple days he initially attributed to the rainy weather.  Today he developed a fever.  States his bone ache.  Took some Tylenol with some relief and came to the emergency room denies chest pain denies shortness of breath denies abdominal pain denies nausea vomiting or diarrhea.    Objective:   Past Medical History:   Diagnosis Date    Esophageal reflux     Essential hypertension     High cholesterol     Hyperlipidemia     Plantar fascial fibromatosis of both feet               Past Surgical History:   Procedure Laterality Date    COLONOSCOPY N/A 10/28/2017    Procedure: COLONOSCOPY;  Surgeon: Krishna Jesus MD;  Location: [facility] ENDOSCOPY    ELECTROCARDIOGRAM, COMPLETE  9-5-2013    SCANNED TO MEDIA TAB                Social History     Socioeconomic History    Marital status:    Tobacco Use    Smoking status: Never    Smokeless tobacco: Never   Vaping Use    Vaping Use: Never used   Substance and Sexual Activity    Alcohol use: Yes     Alcohol/week: 3.3 standard drinks of alcohol     Types: 2 Cans of beer, 2 Shots of liquor per week     Comment: weekly    Drug use: No   Other Topics Concern    Caffeine Concern Yes     Comment: soda              Review of Systems    Positive for stated complaint: Chest Pain  Other systems are as noted in HPI.  Constitutional and vital signs reviewed.      All other systems reviewed and negative except as noted above.    Physical Exam     ED Triage Vitals [04/05/24 0016]   /88   Pulse (!) 130   Resp 22   Temp (!) 100.7 °F (38.2 °C)   Temp src Oral   SpO2 95 %   O2 Device None (Room air)       Current:/88   Pulse (!) 130   Temp (!) 100.7 °F (38.2 °C) (Oral)   Resp 22   Ht 182.9 cm (6')   Wt 88.9 kg   SpO2 95%   BMI 26.58 kg/m²          Physical Exam  Constitutional: Oriented to person, place, and time. Appears well-developed and well-nourished.   HEENT:   Head: Normocephalic and atraumatic.   Right Ear: External ear normal.   Left Ear: External ear normal.   Nose: Nose normal.   Mouth/Throat: Oropharynx is clear and moist.   Eyes: Conjunctivae and EOM are normal. Pupils are equal, round, and reactive to light.   Neck: Neck supple.   Cardiovascular: Normal rate, regular rhythm, normal heart sounds and intact distal pulses.    Pulmonary/Chest: Effort normal and breath sounds normal. No respiratory distress.   Abdominal: Soft. Bowel sounds are normal. Exhibits no distension and no mass. There is no tenderness. There is no rebound and no guarding.   Musculoskeletal: Normal range of motion. Exhibits no edema or tenderness.   Lymphadenopathy: No cervical adenopathy.   Neurological: Alert and oriented to person, place, and time. Normal reflexes. No cranial nerve deficit. No motor os sensory defecits noted Coordination normal.   Skin: Skin is warm and dry.   Psychiatric: Normal mood and affect. Behavior is normal. Judgment and thought content normal.   Nursing note and vitals reviewed.      ED Course     Labs Reviewed   SARS-COV-2/FLU A AND B/RSV BY PCR (GENEXPERT) - Abnormal; Notable for the following components:       Result Value    Influenza A by PCR Positive (*)     All other components within normal limits    Narrative:     This test is intended for the qualitative detection and differentiation of SARS-CoV-2, influenza A, influenza B, and respiratory syncytial virus (RSV) viral RNA in nasopharyngeal or nares swabs from individuals suspected of respiratory viral infection consistent with COVID-19 by their healthcare provider. Signs and symptoms of respiratory viral infection due to SARS-CoV-2, influenza, and RSV can be similar.    Test performed using the Xpert Xpress SARS-CoV-2/FLU/RSV (real time RT-PCR)  assay on the [employer] instrument,  [employer], [place] [address].   This test is being used under the Food and Drug Administration's Emergency Use Authorization.    The authorized Fact Sheet for Healthcare Providers for this assay is available upon request from the laboratory.     EKG    Rate, intervals and axes as noted on EKG Report.  Rate: 123  Rhythm: Sinus Rhythm  Reading: Sinus tachycardia no ischemic change                          MDM      Use of independent historian: Patient's wife assists in giving history    I personally reviewed and interpreted the images : Consider chest x-ray but O2 sat normal and lungs are clear on exam    No results found.    Vitals:    04/05/24 0016   BP: 128/88   Pulse: (!) 130   Resp: 22   Temp: (!) 100.7 °F (38.2 °C)   TempSrc: Oral   SpO2: 95%   Weight: 88.9 kg   Height: 182.9 cm (6')     *I personally reviewed and interpreted all ED vitals.    Pulse Ox: 95%, Room air, Normal     EKG interpretation above independently interpreted by me    Monitor Interpretation:   sinus tachycardia independently interpreted by me    Differential Diagnosis/ Diagnostic Considerations: Febrile tachycardic myalgias.  Consider pneumonia consider viral illness consider influenza consider Covid    Medical Record Review: I personally reviewed available prior medical records for any recent pertinent discharge summaries, testing, and procedures and reviewed those reports and found .    Complicating Factors: The patient already has  which contribute to the complexity of this ED evaluation.    Social determinants of health:    Prescription drug management:      Shared Decision Making:    ED Course: Patient's heart rate down and feels better after Motrin.  Will discharge home.  Plan discussed    Discussion of management with other healthcare providers:    Condition upon leaving the department: Stable                                     Medical Decision Making      Disposition and Plan     Clinical Impression:  1. Influenza          Disposition:  Discharge  4/5/2024  2:47 am    Follow-up:  Hao Baez,   [address] [address] [phone]    Follow up in 3 day(s)            Medications Prescribed:  Current Discharge Medication List

## 2024-04-05 NOTE — ED INITIAL ASSESSMENT (HPI)
C/O Headache, Chills, Fever,and  Body aches since Tuesday. Pt states he took a half of Cialis tablet this evening and started having \"twitches\" and decided to come in. Subsiding. Pain is now 3/10. Denies n/v.

## 2024-04-08 ENCOUNTER — PATIENT OUTREACH (OUTPATIENT)
Dept: CASE MANAGEMENT | Age: 54
End: 2024-04-08

## 2024-04-08 NOTE — PROGRESS NOTES
1st attempt ER f/up apt request    Hao Baez   PCP  [address] [facility] [address]  [facility] [address]  [phone]  Apt: April 9 @8:20am     Confirmed w/ pt  Closing encounter

## 2024-04-09 ENCOUNTER — OFFICE VISIT (OUTPATIENT)
Dept: FAMILY MEDICINE CLINIC | Facility: CLINIC | Age: 54
End: 2024-04-09
Payer: COMMERCIAL

## 2024-04-09 VITALS
TEMPERATURE: 99 F | WEIGHT: 188 LBS | DIASTOLIC BLOOD PRESSURE: 82 MMHG | HEIGHT: 72 IN | HEART RATE: 97 BPM | SYSTOLIC BLOOD PRESSURE: 114 MMHG | BODY MASS INDEX: 25.47 KG/M2

## 2024-04-09 DIAGNOSIS — J10.1 INFLUENZA A: Primary | ICD-10-CM

## 2024-04-09 PROCEDURE — 99213 OFFICE O/P EST LOW 20 MIN: CPT | Performed by: FAMILY MEDICINE

## 2024-04-09 PROCEDURE — 3008F BODY MASS INDEX DOCD: CPT | Performed by: FAMILY MEDICINE

## 2024-04-09 PROCEDURE — 3074F SYST BP LT 130 MM HG: CPT | Performed by: FAMILY MEDICINE

## 2024-04-09 PROCEDURE — 3079F DIAST BP 80-89 MM HG: CPT | Performed by: FAMILY MEDICINE

## 2024-04-09 NOTE — PATIENT INSTRUCTIONS
Continue with hydration and rest.  Supportive measures on the mainstay of treatment.  Note to [employer] the patient from April 9, 2024 with expected return to work on April 15, 2024.

## 2024-04-09 NOTE — PROGRESS NOTES
Subjective:     Patient ID: Vadim Cantrell is a 54 year old male.    This patient is a well-established 54-year-old hypertensive -American gentleman who unfortunately on last Wednesday tested positive for the flu. No GI symptoms. Weakness and body aches were the main symptoms experienced.  Patient reports eyeball pain and headaches. Appetite preserved.  Patient status has improved since the onset of his illness, however he is still sluggish.    Patient will need a note to extend his time off from work starting today with the expectation to return to work on Monday, April 15, 2024.        History/Other:   Review of Systems  Current Outpatient Medications   Medication Sig Dispense Refill    losartan-hydroCHLOROthiazide 50-12.5 MG Oral Tab Take 1 tablet by mouth daily. 90 tablet 1    Vardenafil HCl (LEVITRA) 20 MG Oral Tab Take 1 tablet (20 mg total) by mouth daily as needed for Erectile Dysfunction. 8 tablet 2    methocarbamol 500 MG Oral Tab Take 1 tablet (500 mg total) by mouth 3 (three) times daily as needed (for back pain). (Patient not taking: Reported on 4/5/2024) 15 tablet 0     Allergies:No Known Allergies    Past Medical History:   Diagnosis Date    Esophageal reflux     Essential hypertension     High cholesterol     Hyperlipidemia     Plantar fascial fibromatosis of both feet       Past Surgical History:   Procedure Laterality Date    COLONOSCOPY N/A 10/28/2017    Procedure: COLONOSCOPY;  Surgeon: Krishna Jesus MD;  Location: [facility] ENDOSCOPY    ELECTROCARDIOGRAM, COMPLETE  9-5-2013    SCANNED TO MEDIA TAB      Family History   Problem Relation Age of Onset    Cancer Father         lung      Social History:   Social History     Socioeconomic History    Marital status:    Tobacco Use    Smoking status: Never    Smokeless tobacco: Never   Vaping Use    Vaping Use: Never used   Substance and Sexual Activity    Alcohol use: Yes     Alcohol/week: 3.3 standard drinks of alcohol     Types: 2 Cans  of beer, 2 Shots of liquor per week     Comment: weekly    Drug use: No   Other Topics Concern    Caffeine Concern Yes     Comment: soda        Objective:   Vitals:    04/09/24 0827   BP: 114/82   Pulse: 97   Temp: 98.6 °F (37 °C)       Physical Exam  Constitutional:       Appearance: He is ill-appearing.   HENT:      Head: Normocephalic and atraumatic.      Right Ear: Tympanic membrane normal.      Left Ear: Tympanic membrane normal.      Nose: Congestion present.      Mouth/Throat:      Mouth: Mucous membranes are moist.   Cardiovascular:      Rate and Rhythm: Normal rate and regular rhythm.      Heart sounds:      No gallop.   Pulmonary:      Effort: Pulmonary effort is normal. No respiratory distress.      Breath sounds: Normal breath sounds.   Abdominal:      General: There is no distension.      Palpations: There is no mass.      Tenderness: There is no abdominal tenderness.   Neurological:      General: No focal deficit present.      Mental Status: He is alert.   Psychiatric:         Mood and Affect: Mood normal.         Assessment & Plan:   1. Influenza A  Status is improved, however symptoms still evident.  See patient instructions.      No orders of the defined types were placed in this encounter.      Meds This Visit:  Requested Prescriptions      No prescriptions requested or ordered in this encounter       Imaging & Referrals:  None     Patient Instructions   Continue with hydration and rest.  Supportive measures on the mainstay of treatment.  Note to employer [employer] the patient from April 9, 2024 with expected return to work on April 15, 2024.    Return if symptoms worsen or fail to improve.

## 2024-04-21 DIAGNOSIS — I1A.0 RESISTANT HYPERTENSION: ICD-10-CM

## 2024-04-23 RX ORDER — LOSARTAN POTASSIUM AND HYDROCHLOROTHIAZIDE 12.5; 5 MG/1; MG/1
1 TABLET ORAL DAILY
Qty: 90 TABLET | Refills: 3 | Status: SHIPPED | OUTPATIENT
Start: 2024-04-23

## 2024-04-23 NOTE — TELEPHONE ENCOUNTER
Refill passed per [facility] protocol.  Requested Prescriptions   Pending Prescriptions Disp Refills    losartan-hydroCHLOROthiazide 50-12.5 MG Oral Tab 90 tablet 1     Sig: Take 1 tablet by mouth daily.       Hypertension Medications Protocol Passed - 4/21/2024  7:54 AM        Passed - CMP or BMP in past 12 months        Passed - Last BP reading less than 140/90     BP Readings from Last 1 Encounters:   04/09/24 114/82               Passed - In person appointment or virtual visit in the past 12 mos or appointment in next 3 mos     Recent Outpatient Visits              2 weeks ago Influenza A    [facility] Hao Baez, DO    Office Visit    6 months ago Primary hypertension    [facility] Hao Baez, DO    Office Visit    1 year ago Encounter for screening and preventative care    [facility] Hao Baez, DO    Office Visit    2 years ago Chest discomfort    [facility] Hao Baez, DO    Office Visit    2 years ago Essential hypertension    [facility] Hao Baez, DO    Office Visit          Future Appointments         Provider Department Appt Notes    In 1 month Hao Baez, DO [facility] ALL OF Above    In 5 months Hao Baez, DO [facility] px, last px 10/3/2023                    Passed - EGFRCR or GFRAA > 50     GFR Evaluation  EGFRCR: 76 , resulted on 10/3/2023             Recent Outpatient Visits              2 weeks ago Influenza A    [facility] Hao Baez, DO    Office Visit    6 months ago Primary hypertension    [facility] Hao Baez, DO    Office Visit    1  year ago Encounter for screening and preventative care    [facility] Hao Baez, DO    Office Visit    2 years ago Chest discomfort    [facility] Hao Baez, DO    Office Visit    2 years ago Essential hypertension    [facility] Hao Baez, DO    Office Visit          Future Appointments         Provider Department Appt Notes    In 1 month Hao Baez, DO [facility]    In 5 months Hao Baez, DO [facility] px, last px 10/3/2023

## 2024-05-21 ENCOUNTER — OFFICE VISIT (OUTPATIENT)
Dept: FAMILY MEDICINE CLINIC | Facility: CLINIC | Age: 54
End: 2024-05-21

## 2024-05-21 VITALS
BODY MASS INDEX: 27 KG/M2 | SYSTOLIC BLOOD PRESSURE: 104 MMHG | HEART RATE: 79 BPM | DIASTOLIC BLOOD PRESSURE: 78 MMHG | WEIGHT: 196 LBS

## 2024-05-21 DIAGNOSIS — Z23 NEED FOR ZOSTER VACCINATION: ICD-10-CM

## 2024-05-21 DIAGNOSIS — M99.02 SOMATIC DYSFUNCTION OF THORACOLUMBAR REGION: ICD-10-CM

## 2024-05-21 DIAGNOSIS — M54.50 LOW BACK PAIN, UNSPECIFIED BACK PAIN LATERALITY, UNSPECIFIED CHRONICITY, UNSPECIFIED WHETHER SCIATICA PRESENT: Primary | ICD-10-CM

## 2024-05-21 DIAGNOSIS — Z11.3 ROUTINE SCREENING FOR STI (SEXUALLY TRANSMITTED INFECTION): ICD-10-CM

## 2024-05-21 DIAGNOSIS — Z12.11 COLON CANCER SCREENING: ICD-10-CM

## 2024-05-21 LAB
APPEARANCE: CLEAR
BILIRUBIN: NEGATIVE
GLUCOSE: NEGATIVE
HAV AB SER QL IA: REACTIVE
HBV CORE AB SERPL QL IA: NONREACTIVE
HBV SURFACE AB SER QL: NONREACTIVE
HBV SURFACE AB SERPL IA-ACNC: <3.1 MIU/ML
HBV SURFACE AG SERPL QL IA: NONREACTIVE
HCV AB SERPL QL IA: NONREACTIVE
KETONES (URINE DIPSTICK): NEGATIVE MG/DL
LEUKOCYTES: NEGATIVE
MULTISTIX LOT#: ABNORMAL NUMERIC
NITRITE, URINE: NEGATIVE
PH, URINE: 5.5 (ref 4.5–8)
PROTEIN (URINE DIPSTICK): NEGATIVE MG/DL
SPECIFIC GRAVITY: 1.01 (ref 1–1.03)
T PALLIDUM AB SER QL IA: NONREACTIVE
UROBILINOGEN,SEMI-QN: 0.2 MG/DL (ref 0–1.9)

## 2024-05-21 PROCEDURE — 3078F DIAST BP <80 MM HG: CPT | Performed by: FAMILY MEDICINE

## 2024-05-21 PROCEDURE — 98925 OSTEOPATH MANJ 1-2 REGIONS: CPT | Performed by: FAMILY MEDICINE

## 2024-05-21 PROCEDURE — 90471 IMMUNIZATION ADMIN: CPT | Performed by: FAMILY MEDICINE

## 2024-05-21 PROCEDURE — 99214 OFFICE O/P EST MOD 30 MIN: CPT | Performed by: FAMILY MEDICINE

## 2024-05-21 PROCEDURE — 81002 URINALYSIS NONAUTO W/O SCOPE: CPT | Performed by: FAMILY MEDICINE

## 2024-05-21 PROCEDURE — 3074F SYST BP LT 130 MM HG: CPT | Performed by: FAMILY MEDICINE

## 2024-05-21 PROCEDURE — 90750 HZV VACC RECOMBINANT IM: CPT | Performed by: FAMILY MEDICINE

## 2024-05-21 NOTE — PROCEDURES
Multiple vertebral segments in the thoracolumbar region misaligned. Manual osteopathic manipulative therapy performed and immediate relief obtained. Patient instantaneously improved.

## 2024-05-21 NOTE — PROGRESS NOTES
Subjective:     Patient ID: Vadim Cantrell is a 54 year old male.    This patient is a 54-year-old well-established hypertensive -American gentleman who presents to clinic with several days of midline low back discomfort without radiculopathy into the lower extremities.  Patient denies change in urinary frequency or pattern.  There is no blood in the urine.  There is no fever.  There is no flank pain.  There is no urinary hesitation or straining reported.    There is no recent trauma reported.  Patient did not have a recent fall.    Patient also here to have general screening done.  Asymptomatic.    Patient being referred for colonoscopy and also patient is receptive to shingles vaccine.        History/Other:   Review of Systems  Current Outpatient Medications   Medication Sig Dispense Refill    losartan-hydroCHLOROthiazide 50-12.5 MG Oral Tab Take 1 tablet by mouth daily. 90 tablet 3    Vardenafil HCl (LEVITRA) 20 MG Oral Tab Take 1 tablet (20 mg total) by mouth daily as needed for Erectile Dysfunction. 8 tablet 2     Allergies:No Known Allergies    Past Medical History:    Esophageal reflux    Essential hypertension    High cholesterol    Hyperlipidemia    Plantar fascial fibromatosis of both feet      Past Surgical History:   Procedure Laterality Date    Colonoscopy N/A 10/28/2017    Procedure: COLONOSCOPY;  Surgeon: Krishna Jesus MD;  Location: [facility] ENDOSCOPY    Electrocardiogram, complete  9-5-2013    SCANNED TO MEDIA TAB      Family History   Problem Relation Age of Onset    Cancer Father         lung      Social History:   Social History     Socioeconomic History    Marital status:    Tobacco Use    Smoking status: Never    Smokeless tobacco: Never   Vaping Use    Vaping status: Never Used   Substance and Sexual Activity    Alcohol use: Yes     Alcohol/week: 3.3 standard drinks of alcohol     Types: 2 Cans of beer, 2 Shots of liquor per week     Comment: weekly    Drug use: No   Other Topics  Concern    Caffeine Concern Yes     Comment: soda        Objective:   Vitals:    05/21/24 1744   BP: 104/78   Pulse:        Physical Exam  Constitutional:       Appearance: Normal appearance. He is not ill-appearing.   HENT:      Head: Normocephalic and atraumatic.   Cardiovascular:      Rate and Rhythm: Normal rate and regular rhythm.      Heart sounds:      No gallop.   Pulmonary:      Breath sounds: Normal breath sounds.   Musculoskeletal:      Thoracic back: Spasms and tenderness present. Decreased range of motion.      Lumbar back: Spasms and tenderness present. Decreased range of motion.        Back:       Comments: Regions of malrotation and paraspinal spasm as depicted.   Neurological:      Mental Status: He is alert and oriented to person, place, and time.         Assessment & Plan:   1. Low back pain, unspecified back pain laterality, unspecified chronicity, unspecified whether sciatica present  Urinalysis appears to be normal and not indicative of UTI.  - POC Urinalysis, Manual Dip without microscopy [47050]    2. Colon cancer screening  Referred.  - Gastro Referral - [facility]    3. Need for zoster vaccination    - Zoster Recombinant Adjuvanted (Shingrix -Shingles) [44263]    4. Routine screening for STI (sexually transmitted infection)  Ordered.  - HIV AG AB Combo [E]; Future  - T Pallidum Screening [place]; Future  - Chlamydia/Gc Amplification; Future  - Hepatitis A B + C profile [E]; Future    5. Somatic dysfunction of thoracolumbar region  Osteopathic manipulation performed in the thoracolumbar region with immediate release in the thoracic region.  - OSTEOPATHIC MANIP,1-2 BODY REGN      Orders Placed This Encounter   Procedures    POC Urinalysis, Manual Dip without microscopy [08558]    HIV AG AB Combo [E]    T Pallidum Screening [place]    Hepatitis A B + C profile [E]    Zoster Recombinant Adjuvanted (Shingrix -Shingles) [63290]    OSTEOPATHIC MANIP,1-2 BODY REGN    Chlamydia/Gc  Amplification       Meds This Visit:  Requested Prescriptions      No prescriptions requested or ordered in this encounter       Imaging & Referrals:  ZOSTER VACC RECOMBINANT IM NJX  GASTRO - INTERNAL     Patient Instructions   All adult screening ordered and done appropriate for patient's age and gender and risk factors and complaints.  Encouraged physical fitness and daily physical activity daily.  Monitor blood pressures and record at home. Limit salt intake.  Comply with medications.  Osteopathic manipulation performed in the lumbar region.    Return in about 4 months (around 9/21/2024), or if symptoms worsen or fail to improve.

## 2024-05-21 NOTE — PATIENT INSTRUCTIONS
All adult screening ordered and done appropriate for patient's age and gender and risk factors and complaints.  Encouraged physical fitness and daily physical activity daily.  Monitor blood pressures and record at home. Limit salt intake.  Comply with medications.  Osteopathic manipulation performed in the lumbar region.

## 2024-05-22 LAB — HAV IGM SER QL: NONREACTIVE

## 2024-05-23 ENCOUNTER — TELEPHONE (OUTPATIENT)
Facility: CLINIC | Age: 54
End: 2024-05-23

## 2024-05-23 DIAGNOSIS — Z86.010 HISTORY OF COLON POLYPS: ICD-10-CM

## 2024-05-23 DIAGNOSIS — Z12.11 COLON CANCER SCREENING: Primary | ICD-10-CM

## 2024-05-23 LAB
C TRACH DNA SPEC QL NAA+PROBE: NEGATIVE
N GONORRHOEA DNA SPEC QL NAA+PROBE: NEGATIVE

## 2024-05-23 NOTE — TELEPHONE ENCOUNTER
Operative Report signed by Krishna Jesus MD at 10/28/2017  9:29 AM  Version 1 of 1  Author: Krishna Jesus MD Service: Gastroenterology Author Type: Physician   Filed: 10/28/2017  9:29 AM Date of Service: 10/28/2017  9:25 AM Status: Signed   : Krishna Jesus MD (Physician)   [facility] Endoscopy Report        Preoperative Diagnosis:  - colon screening  - family history of GI malignancy         Postoperative Diagnosis:  - colon polyp x 1  - diverticulum right colon  - small internal hemorrhoids  - gastritis        Procedure:    Colonoscopy   Esophagogastroduodenoscopy         Surgeon:  Krishna Jesus M.D.     Anesthesia:  Fentanyl:  100 mcg IV  Midazolam:  8 mg IV in divided doses.  Sedation managed by Dr. Jesus for 26 minutes.      Technique:  After informed consent, the patient was placed in the left lateral recumbent position.  Digital rectal examination revealed no palpable intraluminal abnormalities.  An Olympus variable stiffness 190 series HD colonoscope was inserted into the rectum and advanced under direct vision by following the lumen to the cecum.  The colon was examined upon withdrawal in the left lateral position.     Following colonoscopy, an Olympus adult HD gastroscope was inserted into the hypopharynx and advanced under direct vision into the esophagus, stomach and duodenum.  The endoscope was withdrawn to the stomach where retroflexion of the annulus, body, cardia and fundus was performed.  The instrument was straightened, insufflated air and fluid were suctioned and the endoscope was withdrawn.  The procedures were well tolerated without immediate complication.        Findings:  The preparation of the colon was good. The ileocecal valve was well preserved. The visualized colonic mucosa from the cecum to the anal verge was normal with an intact vascular pattern.  Colon polyp x 1 removed from the base of the cecum by cold snare, this was 8 mm and pedunculated. Two  clips placed across the mucosal defect.  Excellent hemostasis noted and specimen sent to pathology.      One isolated diverticulum noted in the mid ascending colon      Small internal hemorrhoids     The esophagus was normal.  The GE junction and diaphragmatic impression were at 39 cm.  The stomach distended appropriately with insufflated air.  The mucosa of the stomach showed mild erythema, biopsies taken.  The duodenal bulb and post bulbar regions were normal.        Impression:  - colon polyp x 1  - diverticulum right colon  - small internal hemorrhoids  - gastritis        Recommendations:  - Post polypectomy instructions given  - Repeat colonoscopy in 5 years   - High fiber diet for diverticular disease  - Symptomatic treatment of hemorrhoids  - Follow up gastric biopsy              Krishna Jesus MD  10/28/2017  9:25 AM          Colon polyp 1 cm in size, repeat colonoscopy in 3 years.     The upper scope showed gastritis with bacteria- hpylori - plan treatment, see TE   Written by Krishna Jesus MD on 10/30/2017  5:55 PM CDT  Seen by patient Vadim Cantrell on 5/13/2024 10:08 PM    Specimen to Pathology, Tissue: Result Notes    Older Notes       Notes Recorded by Lea Strong RN on 11/1/2017 at 3:02 PM CDT  See 11/1 encounter  ------     Notes Recorded by Hao Baez DO on 10/31/2017 at 9:29 AM CDT  Results already discussed with patient via GI specialist.  ------     Notes Recorded by Krishna Jesus MD on 10/30/2017 at 5:55 PM CDT  Colon polyp 1 cm in size, repeat colonoscopy in 3 years.     The upper scope showed gastritis with bacteria- hpylori - plan treatment, see TE             Contains abnormal data Specimen to Pathology, Tissue: [id number]  Order: [id number]   Collected 10/28/2017  9:06 AM       Status: Final result       Visible to patient: Yes (seen)       Dx: Family history of gastric cancer; Scr...    3 Result Notes       1 Patient Communication       1 Follow-up Encounter         Component  Ref Range & Units      Case Report     Surgical Pathology                                Case: [id number]                                     Authorizing Provider:  Krishna Jesus MD       Collected:           10/28/2017 09:06 AM             Ordering Location:     [facility]          Received:            10/28/2017 10:16 AM                                    Endoscopy Lab Suites                                                           Pathologist:           Maximo Fountain MD                                                             Specimens:   A) - Cecum, polyp                                                                                      B) - Gastric biopsy                                                                            Final Diagnosis:        A. Cecal polyp; polypectomy:  Fragments of tubular adenoma (1.0 cm in maximum dimension in aggregate).  No evidence of severe dysplasia/carcinoma in situ or infiltrating carcinoma identified.     B. Stomach; biopsy:   Fragments of gastric mucosa demonstrating moderate chronic and focal mild acute gastritis (Helicobacter-associated chronic active gastritis) with associated circumscribed hyperplastic lymphoid cell aggregate.   No evidence of intestinal metaplasia, epithelial dysplasia or malignancy identified.   Numerous bacterial organisms consistent with Helicobacter species seen on Giemsa stain (with appropriate control reactivity).         Electronically signed by Maximo Fountain MD on 10/30/2017 at 10:27 AM        Clinical Information      Z12.11 Screening For Malignant Neoplasm Of Colon.  Z80.0 Family History Of Gastric Cancer.       Gross Description      Specimen A is submitted in formalin labeled “Cantrell, cecal polyp” and consists of multiple pieces of yellow-tan mucosa measuring in aggregate 1.0 x 0.8 x 0.2 cm. The specimen is submitted in total in cassette A.     Specimen B is submitted in formalin labeled “Cantrell, gastric  biopsy” and consists of two pieces of yellow-tan mucosa; one measuring 0.1, the other measures 0.4 cm in greatest dimension. The specimen is submitted in total in cassette B. (zq)     Maximo Fountain M.D./[facility] [place]     Interpretation     Abnormal Abnormal      Electronically signed by Maximo Fountain MD on 10/30/2017 at 10:27 AM     Resulting Agency [facility] ([facility])                Specimen Collected: 10/28/17  9:06 AM Last Resulted: 10/30/17 10:27 AM

## 2024-05-23 NOTE — TELEPHONE ENCOUNTER
Patient calling to schedule 3 year colonoscopy, informed of the 5 business day turnaround, thanks.

## 2024-05-24 NOTE — TELEPHONE ENCOUNTER
Dr. Jesus     Patient called to schedule recall colonoscopy  Please provide orders if ok to schedule directly    Thank you    Last Procedure, Date, MD:  Colonoscopy and EGD Dr. Jesus 10/28/2017  Last Diagnosis:  Colon polyp x1, diverticulum right colon, small internal hemorrhoids, gastritis  Recalled (mth/yrs): 3 yers  Sedation Used Previously: IV   Last Prep Used (if known):  suprep  Quality Of Prep (if known): good  Anticoagulants: no  Diabetic Med's (PO/Injectables): no  Weight loss Med's: no  Iron/Herbal/Multivitamin Supplements (RX/OTC): multivitamin  Marijuana/Vaping/CBD: no  Height & Weight: 6'0\" 188 lbs  BMI: 25.49  Hx of Cardiac/CVA Issues/(MI/Stroke): no  Devices Pacemaker/Defibrillator/Stents: no  Respiratory Issues/Oxygen Use/JEWEL/COPD: no  Issues w/ Anesthesia: no    Symptoms (Y/N): no  Symptoms Details: n/a    Special Comments/Notes: patient's 2020 procedure was canceled due to blood pressure he was recently seen last month and BP in [facility] was 114/82    Please advise on orders and prep.     Thank you!      · Meeting SIRS criteria due to tachycardia and WBC of 13 26  · Tachycardia improved after receiving pain medication for knee pain  · Suspect elevated WBC and tachycardia as a result of pain in the knee post fall    · No current signs of infection  · Continue to monitor without the use of antibiotics at this time

## 2024-05-25 DIAGNOSIS — I1A.0 RESISTANT HYPERTENSION: ICD-10-CM

## 2024-05-28 RX ORDER — LOSARTAN POTASSIUM AND HYDROCHLOROTHIAZIDE 12.5; 5 MG/1; MG/1
1 TABLET ORAL DAILY
Qty: 90 TABLET | Refills: 3 | OUTPATIENT
Start: 2024-05-28

## 2024-06-05 RX ORDER — SODIUM, POTASSIUM,MAG SULFATES 17.5-3.13G
SOLUTION, RECONSTITUTED, ORAL ORAL
Qty: 1 EACH | Refills: 0 | Status: SHIPPED | OUTPATIENT
Start: 2024-06-05

## 2024-06-05 NOTE — TELEPHONE ENCOUNTER
Scheduled for:  Colonoscopy [id number]  Provider Name:  Dr. Jesus  Date:  6/17/2024  Location:   [facility]  Sedation:  MAC  Time:  9:45 AM (patient is aware arrival time is 8:45 AM)   Prep:  Suprep  Meds/Allergies Reconciled?:  Physician reviewed   Diagnosis with codes:  Colon cancer screening Z12.11; Hx: Colon polyps Z86.010  Was patient informed to call insurance with codes (Y/N):  Yes, I confirmed [employer] PPO insurance with the patient.   Referral sent?:  Referral was sent at the time of electronic surgical scheduling.   EM or [facility] notified?:  I sent an electronic request to Endo Scheduling and received a confirmation today.   Medication Orders: Hold multivitamins/supplements one week prior to procedure.  Misc Orders:  N/A     Further instructions given by staff:  I discussed the prep instructions with the patient which he verbally understood and is aware that I will send the instructions today.

## 2024-06-17 ENCOUNTER — ANESTHESIA EVENT (OUTPATIENT)
Dept: ENDOSCOPY | Age: 54
End: 2024-06-17
Payer: COMMERCIAL

## 2024-06-17 ENCOUNTER — HOSPITAL ENCOUNTER (OUTPATIENT)
Age: 54
Setting detail: HOSPITAL OUTPATIENT SURGERY
Discharge: HOME | End: 2024-06-17
Attending: INTERNAL MEDICINE | Admitting: INTERNAL MEDICINE

## 2024-06-17 ENCOUNTER — ANESTHESIA (OUTPATIENT)
Dept: ENDOSCOPY | Age: 54
End: 2024-06-17
Payer: COMMERCIAL

## 2024-06-17 DIAGNOSIS — Z86.010 HISTORY OF COLON POLYPS: ICD-10-CM

## 2024-06-17 DIAGNOSIS — Z12.11 COLON CANCER SCREENING: ICD-10-CM

## 2024-06-17 PROCEDURE — 88305 TISSUE EXAM BY PATHOLOGIST: CPT | Performed by: INTERNAL MEDICINE

## 2024-06-17 RX ORDER — LIDOCAINE HYDROCHLORIDE 10 MG/ML
INJECTION, SOLUTION EPIDURAL; INFILTRATION; INTRACAUDAL; PERINEURAL AS NEEDED
Status: DISCONTINUED | OUTPATIENT
Start: 2024-06-17 | End: 2024-06-17 | Stop reason: SURG

## 2024-06-17 RX ORDER — NALOXONE HYDROCHLORIDE 0.4 MG/ML
0.08 INJECTION, SOLUTION INTRAMUSCULAR; INTRAVENOUS; SUBCUTANEOUS ONCE AS NEEDED
Status: DISCONTINUED | OUTPATIENT
Start: 2024-06-17 | End: 2024-06-17

## 2024-06-17 RX ORDER — SODIUM CHLORIDE, SODIUM LACTATE, POTASSIUM CHLORIDE, CALCIUM CHLORIDE 600; 310; 30; 20 MG/100ML; MG/100ML; MG/100ML; MG/100ML
INJECTION, SOLUTION INTRAVENOUS CONTINUOUS
Status: DISCONTINUED | OUTPATIENT
Start: 2024-06-17 | End: 2024-06-17

## 2024-06-17 RX ADMIN — SODIUM CHLORIDE, SODIUM LACTATE, POTASSIUM CHLORIDE, CALCIUM CHLORIDE: 600; 310; 30; 20 INJECTION, SOLUTION INTRAVENOUS at 09:57:00

## 2024-06-17 RX ADMIN — LIDOCAINE HYDROCHLORIDE 50 MG: 10 INJECTION, SOLUTION EPIDURAL; INFILTRATION; INTRACAUDAL; PERINEURAL at 09:53:00

## 2024-06-17 RX ADMIN — SODIUM CHLORIDE, SODIUM LACTATE, POTASSIUM CHLORIDE, CALCIUM CHLORIDE: 600; 310; 30; 20 INJECTION, SOLUTION INTRAVENOUS at 09:51:00

## 2024-06-17 RX ADMIN — SODIUM CHLORIDE, SODIUM LACTATE, POTASSIUM CHLORIDE, CALCIUM CHLORIDE: 600; 310; 30; 20 INJECTION, SOLUTION INTRAVENOUS at 10:17:00

## 2024-06-17 NOTE — ANESTHESIA PREPROCEDURE EVALUATION
Anesthesia PreOp Note    HPI:     Vadim Cantrell is a 54 year old male who presents for preoperative consultation requested by: Krishna Jesus MD    Date of Surgery: 6/17/2024    Procedure(s):  COLONOSCOPY  Indication: Colon cancer screening /History of colon polyps    Relevant Problems   No relevant active problems       NPO:  Last Liquid Consumption Date: 06/16/24  Last Liquid Consumption Time: 2200  Last Solid Consumption Date: 06/16/24  Last Solid Consumption Time: 1100  Last Liquid Consumption Date: 06/16/24          History Review:  Patient Active Problem List    Diagnosis Date Noted    Plantar fascial fibromatosis of both feet     Chest pain 12/08/2016    Hyperlipidemia 02/22/2016    Hyperglycemia 02/22/2016    Health maintenance examination 01/22/2015       Past Medical History:    Esophageal reflux    Essential hypertension    High blood pressure    High cholesterol    Hyperlipidemia    Plantar fascial fibromatosis of both feet       Past Surgical History:   Procedure Laterality Date    Colonoscopy N/A 10/28/2017    Procedure: COLONOSCOPY;  Surgeon: Krishna Jesus MD;  Location: [facility] ENDOSCOPY    Electrocardiogram, complete  9-5-2013    SCANNED TO MEDIA TAB       Medications Prior to Admission   Medication Sig Dispense Refill Last Dose    Multiple Vitamin (MULTIVITAMIN ADULT OR) Take 1 tablet by mouth daily.   1 mth    losartan-hydroCHLOROthiazide 50-12.5 MG Oral Tab Take 1 tablet by mouth daily. 90 tablet 3 6/17/2024 at 0500    Vardenafil HCl (LEVITRA) 20 MG Oral Tab Take 1 tablet (20 mg total) by mouth daily as needed for Erectile Dysfunction. 8 tablet 2 >72 hr    Na Sulfate-K Sulfate-Mg Sulf (SUPREP BOWEL PREP KIT) 17.5-3.13-1.6 GM/177ML Oral Solution Take as directed 1 each 0      Current Facility-Administered Medications Ordered in Epic   Medication Dose Route Frequency Provider Last Rate Last Admin    lactated ringers infusion   Intravenous Continuous Krishna Jesus MD         No current  [facility]-ordered outpatient medications on file.       No Known Allergies    Family History   Problem Relation Age of Onset    Cancer Father         lung     Social History     Socioeconomic History    Marital status:    Tobacco Use    Smoking status: Never    Smokeless tobacco: Never   Vaping Use    Vaping status: Never Used   Substance and Sexual Activity    Alcohol use: Yes     Alcohol/week: 3.3 standard drinks of alcohol     Types: 2 Cans of beer, 2 Shots of liquor per week     Comment: weekly    Drug use: No   Other Topics Concern    Caffeine Concern Yes     Comment: soda       Available pre-op labs reviewed.             Vital Signs:  Body mass index is 26.72 kg/m².   height is 1.829 m (6') and weight is 89.4 kg (197 lb). His blood pressure is 131/102 (abnormal) and his pulse is 67. His respiration is 16 and oxygen saturation is 96%.   Vitals:    06/07/24 1127 06/17/24 0905   BP:  (!) 131/102   Pulse:  67   Resp:  16   SpO2:  96%   Weight: 89.4 kg (197 lb)    Height: 1.829 m (6')         Anesthesia Evaluation      Airway   Mallampati: I  TM distance: >3 FB  Neck ROM: full  Dental          Pulmonary - negative ROS and normal exam   Cardiovascular - normal exam    Neuro/Psych - negative ROS     GI/Hepatic/Renal      Endo/Other - negative ROS   Abdominal                  Anesthesia Plan:   ASA:  2  Plan:   MAC  Plan Comments: I have discussed the anesthetic plan, major risks and alternatives with the patient and answered all questions. The patient desires to proceed with surgery and anesthesia as planned.     Informed Consent Plan and Risks Discussed With:  Patient      I have informed Vadim Cantrell and/or legal guardian or family member of the nature of the anesthetic plan, benefits, risks including possible dental damage if relevant, major complications, and any alternative forms of anesthetic management.   All of the patient's questions were answered to the best of my ability. The patient desires the  anesthetic management as planned.  BRYAN LEVINE DO  6/17/2024 9:45 AM  Present on Admission:  **None**

## 2024-06-17 NOTE — DISCHARGE INSTRUCTIONS
Home Care Instructions for Colonoscopy with Sedation    Diet:  - Resume your regular diet as tolerated unless otherwise instructed.  - Start with light meals to minimize bloating.  - Do not drink alcohol today.    Medication:  - If you have questions about resuming your normal medications, please contact your Primary Care Physician.    Activities:  - Take it easy today. Do not return to work today.  - Do not drive today.  - Do not operate any machinery today (including kitchen equipment).    Colonoscopy:  - You may notice some rectal \"spotting\" (a little blood on the toilet tissue) for a day or two after the exam. This is normal.  - If you experience any rectal bleeding (not spotting), persistent tenderness or sharp severe abdominal pains, oral temperature over 100 degrees Fahrenheit, light-headedness or dizziness, or any other problems, contact your doctor.      **If unable to reach your doctor, please go to the [facility] Emergency Room**    - Your referring physician will receive a full report of your examination.  - If you do not hear from your doctor's office within two weeks of your biopsy, please call them for your results.    You may be able to see your laboratory results in [employer] between 4 and 7 business days.  In some cases, your physician may not have viewed the results before they are released to [employer].  If you have questions regarding your results contact the physician who ordered the test/exam by phone or via [employer] by choosing \"Ask a Medical Question.\"

## 2024-06-17 NOTE — H&P
History & Physical Examination    Patient Name: Vadim Cantrell  MRN: [id number]  CSN: [id number]  YOB: 1970    Diagnosis:   CRC screening   FH colon cancer      Medications Prior to Admission   Medication Sig Dispense Refill Last Dose    Multiple Vitamin (MULTIVITAMIN ADULT OR) Take 1 tablet by mouth daily.   1 mth    losartan-hydroCHLOROthiazide 50-12.5 MG Oral Tab Take 1 tablet by mouth daily. 90 tablet 3 6/17/2024 at 0500    Vardenafil HCl (LEVITRA) 20 MG Oral Tab Take 1 tablet (20 mg total) by mouth daily as needed for Erectile Dysfunction. 8 tablet 2 >72 hr    Na Sulfate-K Sulfate-Mg Sulf (SUPREP BOWEL PREP KIT) 17.5-3.13-1.6 GM/177ML Oral Solution Take as directed 1 each 0      Current Facility-Administered Medications   Medication Dose Route Frequency    lactated ringers infusion   Intravenous Continuous       Allergies: No Known Allergies    Past Medical History:    Esophageal reflux    Essential hypertension    High blood pressure    High cholesterol    Hyperlipidemia    Plantar fascial fibromatosis of both feet     Past Surgical History:   Procedure Laterality Date    Colonoscopy N/A 10/28/2017    Procedure: COLONOSCOPY;  Surgeon: Krishna Jesus MD;  Location: [facility] ENDOSCOPY    Electrocardiogram, complete  9-5-2013    SCANNED TO MEDIA TAB     Family History   Problem Relation Age of Onset    Cancer Father         lung     Social History     Tobacco Use    Smoking status: Never    Smokeless tobacco: Never   Substance Use Topics    Alcohol use: Yes     Alcohol/week: 3.3 standard drinks of alcohol     Types: 2 Cans of beer, 2 Shots of liquor per week     Comment: weekly       SYSTEM Check if Review is Normal Check if Physical Exam is Normal If not normal, please explain:   HEENT [x ] [ x]    NECK & BACK [x ] [x ]    HEART [x ] [ x]    LUNGS [x ] [ x]    ABDOMEN [x ] [x ]    UROGENITAL [ ] [ ]    EXTREMITIES [x ] [x ]    OTHER        [ x ] I have discussed the risks and benefits and  alternatives with the patient/family.  They understand and agree to proceed with plan of care.  [ x ] I have reviewed the History and Physical done within the last 30 days.  Any changes noted above.    Krishna Jesus MD  6/17/2024  9:46 AM

## 2024-06-17 NOTE — OPERATIVE REPORT
[facility] Endoscopy Report  Date of procedure-June 17, 2024    Preoperative Diagnosis:  -Colorectal cancer screening  -Family history colon cancer      Postoperative Diagnosis:  -Colon polyps x 4  -Diverticulosis  -Internal hemorrhoids      Procedure:    Colonoscopy       Surgeon:  Krishna Jesus M.D.    Anesthesia:  MAC     Technique:  After informed consent, the patient was placed in the left lateral recumbent position.  Digital rectal examination revealed no palpable intraluminal abnormalities.  An Olympus variable stiffness 190 series HD colonoscope was inserted into the rectum and advanced under direct vision by following the lumen to the cecum.  The colon was examined upon withdrawal in the left lateral decubitus.    The procedures were well tolerated without immediate complication.      Findings:  The preparation of the colon was good.  The terminal ileum was examined for 4 cm and visually normal.  The ileocecal valve was well preserved. The visualized colonic mucosa from the cecum to the anal verge was normal with an intact vascular pattern.    Colon polyps x 4 removed as follows;  -Descending colon x 2, both polyps were diminutive and removed by cold forceps technique.  -Rectum x 2, both polyps were diminutive and removed by cold forceps technique.  All polypectomy sites inspected and found to be free of bleeding and specimens retrieved and sent for analysis.    Diverticulosis located in the sigmoid colon, no diverticulitis.    Small internal hemorrhoids noted on retroflexed view.    Estimated blood loss-insignificant  Specimens-see above      Impression:  -Colon polyps x 4  -Diverticulosis  -Internal hemorrhoids    Recommendations:  - Post polypectomy instructions given  - Repeat colonoscopy in 3- 5 years  - High fiber diet for diverticular disease  - Symptomatic treatment of hemorrhoids          Krishna Jesus MD  6/17/2024  10:27 AM

## 2024-06-17 NOTE — ANESTHESIA POSTPROCEDURE EVALUATION
Patient: Vadim Cantrell    Procedure Summary       Date: 06/17/24 Room / Location: [facility] ENDOSCOPY 01 / [facility] ENDO    Anesthesia Start: 0951 Anesthesia Stop: 1023    Procedure: COLONOSCOPY Diagnosis:       Colon cancer screening      History of colon polyps      (colon polyps, diverticulosis, hemorrhoids)    Surgeons: Krishna Jesus MD Anesthesiologist: Obdulio Simmons DO    Anesthesia Type: MAC ASA Status: 2            Anesthesia Type: MAC    Vitals Value Taken Time   /90 06/17/24 1023   Temp  06/17/24 1026   Pulse 91 06/17/24 1025   Resp 16 06/17/24 1025   SpO2 99 % 06/17/24 1023   Vitals shown include unfiled device data.    [facility] AN Post Evaluation:   Patient Evaluated in PACU  Patient Participation: complete - patient participated  Level of Consciousness: awake  Pain Management: adequate  Airway Patency:patent  Dental exam unchanged from preop  Yes    Nausea/Vomiting: none  Cardiovascular Status: acceptable  Respiratory Status: acceptable  Postoperative Hydration acceptable      OBDULIO SIMMONS DO  6/17/2024 10:26 AM

## 2024-06-18 ENCOUNTER — TELEPHONE (OUTPATIENT)
Facility: CLINIC | Age: 54
End: 2024-06-18

## 2024-06-18 VITALS
HEIGHT: 72 IN | WEIGHT: 197 LBS | OXYGEN SATURATION: 97 % | DIASTOLIC BLOOD PRESSURE: 84 MMHG | SYSTOLIC BLOOD PRESSURE: 116 MMHG | RESPIRATION RATE: 20 BRPM | BODY MASS INDEX: 26.68 KG/M2 | HEART RATE: 78 BPM

## 2024-06-18 NOTE — TELEPHONE ENCOUNTER
----- Message from Krishna Jesus sent at 6/18/2024 12:57 PM CDT -----  I wanted to get back to you with your colonoscopy results.  You had 4 colon polyps removed which were benign.  I would advise a repeat colonoscopy in 5 years to make sure no new polyps are forming.      You also have internal hemorrhoids and diverticulosis.  Please stay on a high fiber diet and call with any questions.

## 2024-06-18 NOTE — TELEPHONE ENCOUNTER
Colon recall entered for 5 years per Dr. Jesus. Last colonoscopy done 06/17/24. Health maintenance update, pt outreach sent.

## 2024-10-15 ENCOUNTER — LAB ENCOUNTER (OUTPATIENT)
Dept: LAB | Age: 54
End: 2024-10-15
Attending: FAMILY MEDICINE
Payer: COMMERCIAL

## 2024-10-15 ENCOUNTER — OFFICE VISIT (OUTPATIENT)
Dept: FAMILY MEDICINE CLINIC | Facility: CLINIC | Age: 54
End: 2024-10-15

## 2024-10-15 VITALS
DIASTOLIC BLOOD PRESSURE: 80 MMHG | TEMPERATURE: 98 F | SYSTOLIC BLOOD PRESSURE: 110 MMHG | HEIGHT: 72 IN | WEIGHT: 203 LBS | BODY MASS INDEX: 27.5 KG/M2 | OXYGEN SATURATION: 95 % | RESPIRATION RATE: 18 BRPM | HEART RATE: 91 BPM

## 2024-10-15 DIAGNOSIS — M99.02 SOMATIC DYSFUNCTION OF THORACIC REGION: ICD-10-CM

## 2024-10-15 DIAGNOSIS — Z28.21 INFLUENZA VACCINATION DECLINED BY PATIENT: ICD-10-CM

## 2024-10-15 DIAGNOSIS — I10 PRIMARY HYPERTENSION: ICD-10-CM

## 2024-10-15 DIAGNOSIS — Z23 NEED FOR VARICELLA VACCINE: ICD-10-CM

## 2024-10-15 DIAGNOSIS — Z00.00 ROUTINE PHYSICAL EXAMINATION: Primary | ICD-10-CM

## 2024-10-15 DIAGNOSIS — M54.6 ACUTE RIGHT-SIDED THORACIC BACK PAIN: ICD-10-CM

## 2024-10-15 DIAGNOSIS — Z00.00 ROUTINE PHYSICAL EXAMINATION: ICD-10-CM

## 2024-10-15 LAB
ALBUMIN SERPL-MCNC: 4.7 G/DL (ref 3.2–4.8)
ALBUMIN/GLOB SERPL: 1.5 {RATIO} (ref 1–2)
ALP LIVER SERPL-CCNC: 54 U/L
ALT SERPL-CCNC: 31 U/L
ANION GAP SERPL CALC-SCNC: 7 MMOL/L (ref 0–18)
AST SERPL-CCNC: 29 U/L (ref ?–34)
BASOPHILS # BLD AUTO: 0.04 X10(3) UL (ref 0–0.2)
BASOPHILS NFR BLD AUTO: 0.6 %
BILIRUB SERPL-MCNC: 0.6 MG/DL (ref 0.3–1.2)
BILIRUB UR QL: NEGATIVE
BUN BLD-MCNC: 16 MG/DL (ref 9–23)
BUN/CREAT SERPL: 14.8 (ref 10–20)
CALCIUM BLD-MCNC: 10.4 MG/DL (ref 8.7–10.4)
CHLORIDE SERPL-SCNC: 103 MMOL/L (ref 98–112)
CHOLEST SERPL-MCNC: 207 MG/DL (ref ?–200)
CLARITY UR: CLEAR
CO2 SERPL-SCNC: 28 MMOL/L (ref 21–32)
COMPLEXED PSA SERPL-MCNC: 1.67 NG/ML (ref ?–4)
CREAT BLD-MCNC: 1.08 MG/DL
DEPRECATED RDW RBC AUTO: 36.5 FL (ref 35.1–46.3)
EGFRCR SERPLBLD CKD-EPI 2021: 83 ML/MIN/1.73M2 (ref 60–?)
EOSINOPHIL # BLD AUTO: 0.05 X10(3) UL (ref 0–0.7)
EOSINOPHIL NFR BLD AUTO: 0.8 %
ERYTHROCYTE [DISTWIDTH] IN BLOOD BY AUTOMATED COUNT: 12.6 % (ref 11–15)
FASTING PATIENT LIPID ANSWER: YES
FASTING STATUS PATIENT QL REPORTED: YES
GLOBULIN PLAS-MCNC: 3.1 G/DL (ref 2–3.5)
GLUCOSE BLD-MCNC: 99 MG/DL (ref 70–99)
GLUCOSE UR-MCNC: NORMAL MG/DL
HCT VFR BLD AUTO: 45.3 %
HDLC SERPL-MCNC: 44 MG/DL (ref 40–59)
HGB BLD-MCNC: 15.8 G/DL
HGB UR QL STRIP.AUTO: NEGATIVE
IMM GRANULOCYTES # BLD AUTO: 0.02 X10(3) UL (ref 0–1)
IMM GRANULOCYTES NFR BLD: 0.3 %
KETONES UR-MCNC: NEGATIVE MG/DL
LDLC SERPL CALC-MCNC: 126 MG/DL (ref ?–100)
LEUKOCYTE ESTERASE UR QL STRIP.AUTO: NEGATIVE
LYMPHOCYTES # BLD AUTO: 2.38 X10(3) UL (ref 1–4)
LYMPHOCYTES NFR BLD AUTO: 38.2 %
MCH RBC QN AUTO: 28.1 PG (ref 26–34)
MCHC RBC AUTO-ENTMCNC: 34.9 G/DL (ref 31–37)
MCV RBC AUTO: 80.6 FL
MONOCYTES # BLD AUTO: 0.48 X10(3) UL (ref 0.1–1)
MONOCYTES NFR BLD AUTO: 7.7 %
NEUTROPHILS # BLD AUTO: 3.26 X10 (3) UL (ref 1.5–7.7)
NEUTROPHILS # BLD AUTO: 3.26 X10(3) UL (ref 1.5–7.7)
NEUTROPHILS NFR BLD AUTO: 52.4 %
NITRITE UR QL STRIP.AUTO: NEGATIVE
NONHDLC SERPL-MCNC: 163 MG/DL (ref ?–130)
OSMOLALITY SERPL CALC.SUM OF ELEC: 287 MOSM/KG (ref 275–295)
PH UR: 5 [PH] (ref 5–8)
PLATELET # BLD AUTO: 249 10(3)UL (ref 150–450)
POTASSIUM SERPL-SCNC: 3.9 MMOL/L (ref 3.5–5.1)
PROT SERPL-MCNC: 7.8 G/DL (ref 5.7–8.2)
PROT UR-MCNC: NEGATIVE MG/DL
RBC # BLD AUTO: 5.62 X10(6)UL
SODIUM SERPL-SCNC: 138 MMOL/L (ref 136–145)
SP GR UR STRIP: 1.01 (ref 1–1.03)
TRIGL SERPL-MCNC: 210 MG/DL (ref 30–149)
TSI SER-ACNC: 2.1 MIU/ML (ref 0.55–4.78)
UROBILINOGEN UR STRIP-ACNC: NORMAL
VLDLC SERPL CALC-MCNC: 38 MG/DL (ref 0–30)
WBC # BLD AUTO: 6.2 X10(3) UL (ref 4–11)

## 2024-10-15 PROCEDURE — 36415 COLL VENOUS BLD VENIPUNCTURE: CPT

## 2024-10-15 PROCEDURE — 80053 COMPREHEN METABOLIC PANEL: CPT

## 2024-10-15 PROCEDURE — 84443 ASSAY THYROID STIM HORMONE: CPT

## 2024-10-15 PROCEDURE — 81003 URINALYSIS AUTO W/O SCOPE: CPT

## 2024-10-15 PROCEDURE — 80061 LIPID PANEL: CPT

## 2024-10-15 PROCEDURE — 85025 COMPLETE CBC W/AUTO DIFF WBC: CPT

## 2024-10-15 RX ORDER — CYCLOBENZAPRINE HCL 5 MG
5 TABLET ORAL NIGHTLY
Qty: 30 TABLET | Refills: 0 | Status: SHIPPED | OUTPATIENT
Start: 2024-10-15

## 2024-10-15 NOTE — PATIENT INSTRUCTIONS
All adult screening ordered and done appropriate for patient's age and gender and risk factors and complaints.  Medication reviewed and renewed where needed and appropriate.  Comply with medications.  Monitor blood pressures and record at home. Limit salt intake.  Encouraged safe physical fitness and daily physical activity daily.  Osteopathic manipulation attempted in the thoracic region-spasm.

## 2024-10-15 NOTE — PROGRESS NOTES
Subjective:     Patient ID: Vadim Cantrell is a 54 year old male.    This patient is a 54-year-old well-established hypertensive -American male here for complete preventive care physical and for status update on any confirmed chronic medical illnesses and follow up on any previous labs or procedures that were suggestive or in need of further work up. Colonoscopy is current. Bowel, bladder, and sexual functions are intact.    Patient denies headaches, chest pain, dizziness, shortness of breath, acute visual changes, and/or exertional fatigue.    Patient declines on shingles vaccine.  Patient consents to seasonal flu vaccine.    Patient reports a nontrauma related discomfort affecting the right side of his body-flank. Duration of 2-3 days. Mild workouts.               History/Other:   Review of Systems  Current Outpatient Medications   Medication Sig Dispense Refill    Multiple Vitamin (MULTIVITAMIN ADULT OR) Take 1 tablet by mouth daily.      losartan-hydroCHLOROthiazide 50-12.5 MG Oral Tab Take 1 tablet by mouth daily. 90 tablet 3    Vardenafil HCl (LEVITRA) 20 MG Oral Tab Take 1 tablet (20 mg total) by mouth daily as needed for Erectile Dysfunction. 8 tablet 2     Allergies:Allergies[1]    Past Medical History:    Esophageal reflux    Essential hypertension    High blood pressure    High cholesterol    Hyperlipidemia    Plantar fascial fibromatosis of both feet      Past Surgical History:   Procedure Laterality Date    Colonoscopy N/A 10/28/2017    Procedure: COLONOSCOPY;  Surgeon: Krishna Jesus MD;  Location: [facility] ENDOSCOPY    Colonoscopy N/A 6/17/2024    Procedure: COLONOSCOPY;  Surgeon: Krishna Jesus MD;  Location: [facility]    Electrocardiogram, complete  9-5-2013    SCANNED TO MEDIA TAB      Family History   Problem Relation Age of Onset    Cancer Father         lung      Social History:   Social History     Socioeconomic History    Marital status:    Tobacco Use    Smoking status: Never     Smokeless tobacco: Never   Vaping Use    Vaping status: Never Used   Substance and Sexual Activity    Alcohol use: Yes     Alcohol/week: 3.3 standard drinks of alcohol     Types: 2 Cans of beer, 2 Shots of liquor per week     Comment: weekly    Drug use: No   Other Topics Concern    Caffeine Concern Yes     Comment: soda        Objective:   Vitals:    10/15/24 1429   BP: 137/84   Pulse: 91   Resp: 18   Temp: 98 °F (36.7 °C)       Physical Exam  Constitutional:       General: He is not in acute distress.     Appearance: Normal appearance. He is not ill-appearing.   HENT:      Head: Normocephalic and atraumatic.      Right Ear: Tympanic membrane normal.      Left Ear: Tympanic membrane normal.      Nose: Nose normal.      Mouth/Throat:      Mouth: Mucous membranes are moist.   Cardiovascular:      Rate and Rhythm: Normal rate and regular rhythm.      Heart sounds:      No gallop.   Pulmonary:      Breath sounds: Normal breath sounds.   Abdominal:      General: Bowel sounds are normal.      Palpations: Abdomen is soft.   Neurological:      Mental Status: He is alert and oriented to person, place, and time.   Psychiatric:         Mood and Affect: Mood normal.       Assessment & Plan:   1. Routine physical examination  General well exam and the following labs have been ordered.  - CBC W Differential W Platelet [E]; Future  - Comp Metabolic Panel (14) [E]; Future  - Lipid Panel [E]; Future  - TSH [E]; Future  - Urinalysis, Routine [E]; Future  - PSA Total, Screen; Future    2. Primary hypertension  To goal. Compliance emphasized.    3. Need for varicella vaccine  Ordered.  - Zoster Recombinant Adjuvanted (Shingrix -Shingles) [57073]    4. Influenza vaccination declined by patient  Declined.  - Fluzone trivalent vaccine, PF 0.5mL, 6mo+ (22501)    5. Acute right-sided thoracic back pain  Prescribed.  - cyclobenzaprine 5 MG Oral Tab; Take 1 tablet (5 mg total) by mouth nightly.  Dispense: 30 tablet; Refill: 0    6.  Somatic dysfunction of thoracic region  Prescribed. Manipulation hindered by spasm.  - cyclobenzaprine 5 MG Oral Tab; Take 1 tablet (5 mg total) by mouth nightly.  Dispense: 30 tablet; Refill: 0  - OSTEOPATHIC MANIP,1-2 BODY REGN        No orders of the defined types were placed in this encounter.      Meds This Visit:  Requested Prescriptions      No prescriptions requested or ordered in this encounter       Imaging & Referrals:  ZOSTER VACC RECOMBINANT IM NJX  INFLUENZA VACCINE, TRI, PRESERV FREE, 0.5 ML     Patient Instructions   All adult screening ordered and done appropriate for patient's age and gender and risk factors and complaints.  Medication reviewed and renewed where needed and appropriate.  Comply with medications.  Monitor blood pressures and record at home. Limit salt intake.  Encouraged safe physical fitness and daily physical activity daily.    Return in about 1 year (around 10/15/2025), or if symptoms worsen or fail to improve.       [1] No Known Allergies

## 2025-01-10 RX ORDER — VARDENAFIL HYDROCHLORIDE 20 MG/1
20 TABLET ORAL
Qty: 8 TABLET | Refills: 2 | Status: SHIPPED | OUTPATIENT
Start: 2025-01-10

## 2025-05-18 DIAGNOSIS — I1A.0 RESISTANT HYPERTENSION: ICD-10-CM

## 2025-05-20 RX ORDER — LOSARTAN POTASSIUM AND HYDROCHLOROTHIAZIDE 12.5; 5 MG/1; MG/1
1 TABLET ORAL DAILY
Qty: 90 TABLET | Refills: 3 | Status: SHIPPED | OUTPATIENT
Start: 2025-05-20

## (undated) DIAGNOSIS — I10 RESISTANT HYPERTENSION: ICD-10-CM

## (undated) DEVICE — GIJAW SINGLE-USE BIOPSY FORCEPS WITH NEEDLE: Brand: GIJAW

## (undated) DEVICE — SNARE CAPTIFLEX MICRO-OVL OLY

## (undated) DEVICE — SYRINGE, LUER SLIP, STERILE, 60ML: Brand: MEDLINE

## (undated) DEVICE — LINE MNTR ADLT SET O2 INTMD

## (undated) DEVICE — CLIP RESOLUTION 235CM

## (undated) DEVICE — KIT CLEAN ENDOKIT 1.1OZ GOWNX2

## (undated) DEVICE — KIT ENDO ORCAPOD 160/180/190

## (undated) DEVICE — Device: Brand: DEFENDO AIR/WATER/SUCTION AND BIOPSY VALVE

## (undated) DEVICE — MEDI-VAC NON-CONDUCTIVE SUCTION TUBING 6MM X 1.8M (6FT.) L: Brand: CARDINAL HEALTH

## (undated) DEVICE — Device: Brand: CUSTOM PROCEDURE KIT

## (undated) DEVICE — Device: Brand: DUAL NARE NASAL CANNULAE FEMALE LUER CON 7FT O2 TUBE

## (undated) DEVICE — ENDOSCOPY PACK - LOWER: Brand: MEDLINE INDUSTRIES, INC.

## (undated) DEVICE — FORCEP RADIAL JAW 4

## (undated) DEVICE — SNARE OPTMZ PLPCTM TRP

## (undated) DEVICE — ENDOSCOPY PACK UPPER: Brand: MEDLINE INDUSTRIES, INC.

## (undated) NOTE — LETTER
Patient Name: Tiana Trejo  : 1970  MRN: [id number]  Patient Address: [address] [place] [address] is committed to the safety and well-being of our patients, members, employees, and treatments, when available and appropriate, should be given as soon as possible after diagnosis.       Seek Further Care      If you are awaiting test results or are confirmed positive for COVID-19, and your symptoms worsen at home with symptoms such as: ex household cleaning sprays or wipes according to the label instructions. Post-Discharge Follow-up  Please call your primary care provider within two days of your discharge to arrange for a telehealth follow-up.  CDC does not recommend repeat testing Prevention (CDC)  10 things you can do to manage your health at home, [email or web address]. pdf  [email or web address]  ht

## (undated) NOTE — LETTER
10/15/2024              Vadim Cantrell        [address] [facility] [address]         To Whom it may concern:    This is to certify that Vadim Cantrell had an appointment on 10/15/2024 with Hao Baez DO.  Please excuse him as he was in attendance for his appointment.       Sincerely,    Hao Baez DO  [address] [address]  [phone]

## (undated) NOTE — LETTER
[facility]  [address]  [phone]     Patient: Awilda Zamarripa   YOB: 1970   Date of Visit: 11/12/2020     Dear Employer,        November 12, 2020    At [facility], we are taking special pr Persons infected with SARS-CoV-2 who never develop COVID-19 symptoms may discontinue isolation and other precautions 10 days after the date of their first positive RT-PCR test for SARS-CoV-2 RNA.     Persons who are asymptomatic but have been exposed, CDC r

## (undated) NOTE — LETTER
5/12/2021          To Whom It May Concern:    Angella Tosha is currently under my medical care. Please excuse him from work from 5/17/21 through 6/1/21. He will follow up with me on 6/1/21.    If you require additional information please contact our offi

## (undated) NOTE — LETTER
10/3/2022              Jg         To Whom it may concern: This is to certify that Le Parra had an appointment on 10/3/2022 at 10:36 AM with Roque Franco DO. Please excuse patient from his absence at work today.   Sincerely,    Roque Franco DO  [address] [facility], [facility] [place] [address] [phone]        Document electronically generated by:  Jenni Deng MA

## (undated) NOTE — LETTER
[facility], [place]  Authorization for Invasive Procedures  1.  I hereby authorize Dr. Demetrius Kumar** , my physician and whomever may be designated as the doctor's assistant, to perform the following operation and/or procedure:  *SAMUEL 4. Should the need arise during my operation or immediate post-operative period; I also consent to the administration of blood and/or blood products.  Further, I understand that despite careful testing and screening of blood and blood products, I may still 9. Patients having a sterilization procedure: I understand that if the procedure is successful the results will be permanent and it will therefore be impossible for me to inseminate, conceive or bear children.  I also understand that the procedure is intend

## (undated) NOTE — LETTER
6/2/2021          To Whom It May Concern:    Jeb Pope is currently under my medical care. Please excuse him from work from 6/1/21 through 6/7/21. If you require additional information please contact our office.         Sincerely,    Fausto Nelson

## (undated) NOTE — LETTER
2/6/2018          To Whom It May Concern:    Roc Jeffers is currently under my medical care and may not return to work at this time. Please excuse Booth Alert for 3 days 02/05/18. He may return to work on 02/08/18.   Activity is restricted as follows: n

## (undated) NOTE — LETTER
Date & Time: 4/15/2021, 5:14 PM  Patient: Racheal Jennings  Encounter Provider(s):    GUEVARA Mercer       To Whom It May Concern:    Lilly Gomez was seen and treated in our department on 4/15/2021. He should not return to work until 04/19/2021.

## (undated) NOTE — LETTER
[facility]  Administration of Anesthesia  I, Vadim DARRELL Cantrell agree to be cared for by a physician anesthesiologist alone and/or with a nurse anesthetist, who is specially trained to monitor me and give me medicine to put me to sleep or keep me comfortable during my procedure    I understand that my anesthesiologist and/or anesthetist is not an employee or agent of [facility] or [employer]. He or she works for [facility]    As the patient asking for anesthesia services, I agree to:  Allow the anesthesiologist (anesthesia doctor) to give me medicine and do additional procedures as necessary. Some examples are: Starting or using an “IV” to give me medicine, fluids or blood during my procedure, and having a breathing tube placed to help me breathe when I’m asleep (intubation). In the event that my heart stops working properly, I understand that my anesthesiologist will make every effort to sustain my life, unless otherwise directed by [facility] Do Not Resuscitate documents.  Tell my anesthesia doctor before my procedure:  If I am pregnant.  The last time that I ate or drank.  iii. All of the medicines I take (including prescriptions, herbal supplements, and pills I can buy without a prescription (including street drugs/illegal medications). Failure to inform my anesthesiologist about these medicines may increase my risk of anesthetic complications.  iv.If I am allergic to anything or have had a reaction to anesthesia before.  I understand how the anesthesia medicine will help me (benefits).  I understand that with any type of anesthesia medicine there are risks:  The most common risks are: nausea, vomiting, sore throat, muscle soreness, damage to my eyes, mouth, or teeth (from breathing tube placement).  Rare risks include: remembering what happened during my procedure, allergic reactions to medications, injury to my airway, heart, lungs, vision, nerves, or  muscles and in extremely rare instances death.  My doctor has explained to me other choices available to me for my care (alternatives).  Pregnant Patients (“epidural”):  I understand that the risks of having an epidural (medicine given into my back to help control pain during labor), include itching, low blood pressure, difficulty urinating, headache or slowing of the baby’s heart. Very rare risks include infection, bleeding, seizure, irregular heart rhythms and nerve injury.  Regional Anesthesia (“spinal”, “epidural”, & “nerve blocks”):  I understand that rare but potential complications include headache, bleeding, infection, seizure, irregular heart rhythms, and nerve injury.    _____________________________________________________________________________  Patient (or Representative) Signature/Relationship to Patient  Date   Time    _____________________________________________________________________________   Name (if used)    Language/Organization   Time    _____________________________________________________________________________  Nurse Anesthetist Signature     Date   Time  _____________________________________________________________________________  Anesthesiologist Signature     Date   Time  I have discussed the procedure and information above with the patient (or patient’s representative) and answered their questions. The patient or their representative has agreed to have anesthesia services.    _____________________________________________________________________________  Witness        Date   Time  I have verified that the signature is that of the patient or patient’s representative, and that it was signed before the procedure  Patient Name: Vadim Cantrell     : 1970                 Printed: 2024 at 8:55 AM    Medical Record #: [id number]                                            Page 1 of 1  ----------ANESTHESIA CONSENT----------

## (undated) NOTE — LETTER
5/5/2021          To Whom It May Concern:    Hima Lorenzo is currently under my medical care. Please excuse him from work 5/3/21 through 5/8/21 due to left foot stress fracture. If you require additional information please contact our office.

## (undated) NOTE — LETTER
8/17/2020    [facility]            Dear Denton Dandy,      Our records indicate that you are due for an appointment for a Colonoscopy with Sebastian Suazo MD.    Please call our office to schedule this a

## (undated) NOTE — LETTER
10/3/2023          To Whom It May Concern:    Chris Ward is currently under my medical care and may not return to work at this time. Please excuse Hiren Ko for 1 days. He may return to work on 10/04/2023. Activity is restricted as follows: none. If you require additional information please contact our office. Sincerely,    Alessandra Leon, DO          Document generated by:   Tien Roque

## (undated) NOTE — MR AVS SNAPSHOT
[facility]  [place], [address] [facility] [address]  [phone]               Thank you for choosing us for your health care visit with Yessy Matos DO.   We are glad to serve you and happy to provide you with this sum requirements for authorization, please wait 5-7 days and then contact your physician's office. At that time, you will be provided with any authorization numbers or be assured that none are required. You can then schedule your appointment.  Failure to obtain No Known Allergies                Today's Vital Signs     BP Pulse Temp Height Weight BMI    148/94 mmHg 82 98.8 °F (37.1 °C) (Oral) 6' (1.829 m) 186 lb (84.369 kg) 25.22 kg/m2         Current Medications          This list is accurate as of: 5/1/17  9:37

## (undated) NOTE — LETTER
12/31/2020      REGARDING:        [place]         To whom it may concern or the supervisor for the above named:    Please be advised that Mr. AdamsTosha Vetoalicia had to attend a medical visit at our off

## (undated) NOTE — LETTER
3/3/2020          To Whom It May Concern:    Hien Live is currently under my medical care and was seen in my office today. If you require additional information please contact our office.         Sincerely,    Wali Ritchie DPM

## (undated) NOTE — LETTER
[facility]  [address], [place], [place]    Authorization for Surgical Operation and Procedure                               I hereby authorize Krishna Jesus MD, my physician and his/her assistants (if applicable), which may include medical students, residents, and/or fellows, to perform the following surgical operation/ procedure and administer such anesthesia as may be determined necessary by my physician: Operation/Procedure name (s) COLONOSCOPY on Vadim Cantrell   2.   I recognize that during the surgical operation/procedure, unforeseen conditions may necessitate additional or different procedures than those listed above.  I, therefore, further authorize and request that the above-named surgeon, assistants, or designees perform such procedures as are, in their judgment, necessary and desirable.    3.   My surgeon/physician has discussed prior to my surgery the potential benefits, risks and side effects of this procedure; the likelihood of achieving goals; and potential problems that might occur during recuperation.  They also discussed reasonable alternatives to the procedure, including risks, benefits, and side effects related to the alternatives and risks related to not receiving this procedure.  I have had all my questions answered and I acknowledge that no guarantee has been made as to the result that may be obtained.    4.   Should the need arise during my operation/procedure, which includes change of level of care prior to discharge, I also consent to the administration of blood and/or blood products.  Further, I understand that despite careful testing and screening of blood or blood products by collecting agencies, I may still be subject to ill effects as a result of receiving a blood transfusion and/or blood products.  The following are some, but not all, of the potential risks that can occur: fever and allergic reactions, hemolytic reactions, transmission of diseases such as  Hepatitis, AIDS and Cytomegalovirus (CMV) and fluid overload.  In the event that I wish to have an autologous transfusion of my own blood, or a directed donor transfusion, I will discuss this with my physician.  Check only if Refusing Blood or Blood Products  I understand refusal of blood or blood products as deemed necessary by my physician may have serious consequences to my condition to include possible death. I hereby assume responsibility for my refusal and release the hospital, its personnel, and my physicians from any responsibility for the consequences of my refusal.    o  Refuse   5.   I authorize the use of any specimen, organs, tissues, body parts or foreign objects that may be removed from my body during the operation/procedure for diagnosis, research or teaching purposes and their subsequent disposal by hospital authorities.  I also authorize the release of specimen test results and/or written reports to my treating physician on the hospital medical staff or other referring or consulting physicians involved in my care, at the discretion of the Pathologist or my treating physician.    6.   I consent to the photographing or videotaping of the operations or procedures to be performed, including appropriate portions of my body for medical, scientific, or educational purposes, provided my identity is not revealed by the pictures or by descriptive texts accompanying them.  If the procedure has been photographed/videotaped, the surgeon will obtain the original picture, image, videotape or CD.  The hospital will not be responsible for storage, release or maintenance of the picture, image, tape or CD.    7.   I consent to the presence of a  or observers in the operating room as deemed necessary by my physician or their designees.    8.   I recognize that in the event my procedure results in extended X-Ray/fluoroscopy time, I may develop a skin reaction.    9. If I have a Do Not Attempt  Resuscitation (DNAR) order in place, that status will be suspended while in the operating room, procedural suite, and during the recovery period unless otherwise explicitly stated by me (or a person authorized to consent on my behalf). The surgeon or my attending physician will determine when the applicable recovery period ends for purposes of reinstating the DNAR order.  10. Patients having a sterilization procedure: I understand that if the procedure is successful the results will be permanent and it will therefore be impossible for me to inseminate, conceive, or bear children.  I also understand that the procedure is intended to result in sterility, although the result has not been guaranteed.   11. I acknowledge that my physician has explained sedation/analgesia administration to me including the risk and benefits I consent to the administration of sedation/analgesia as may be necessary or desirable in the judgment of my physician.    I CERTIFY THAT I HAVE READ AND FULLY UNDERSTAND THE ABOVE CONSENT TO OPERATION and/or OTHER PROCEDURE.     ____________________________________  _________________________________        ______________________________  Signature of Patient    Signature of Responsible Person                Printed Name of Responsible Person                                      ____________________________________  _____________________________                ________________________________  Signature of Witness        Date  Time         Relationship to Patient    STATEMENT OF PHYSICIAN My signature below affirms that prior to the time of the procedure; I have explained to the patient and/or his/her legal representative, the risks and benefits involved in the proposed treatment and any reasonable alternative to the proposed treatment. I have also explained the risks and benefits involved in refusal of the proposed treatment and alternatives to the proposed treatment and have answered the patient's  questions. If I have a significant financial interest in a co-management agreement or a significant financial interest in any product or implant, or other significant relationship used in this procedure/surgery, I have disclosed this and had a discussion with my patient.     _____________________________________________________              _____________________________  (Signature of Physician)                                                                                         (Date)                                   (Time)  Patient Name: Vadim Cantrell      : 1970      Printed: 2024     Medical Record #: [id number]                                      Page 1 of 1

## (undated) NOTE — MR AVS SNAPSHOT
[facility]  [address], [address]  [phone]               Thank you for choosing us for your health care visit with Elham De Oliveira DO.   We are glad to serve you and happy to provide you with this sum Summaries. If you've been to the Emergency Department or your doctor's office, you can view your past visit information in [employer] by going to Visits < Visit Summaries. [employer] questions? Call [phone] for help.   [employer] is NOT to be used for urge Water is best for hydration Fast food. Eat at home when possible     Tips for increasing your physical activity – Adults who are physically active are less likely to develop some chronic diseases than adults who are inactive.      HOW TO GET STARTED: HOW

## (undated) NOTE — LETTER
[facility]  Administration of Anesthesia  1. Jong Sheridan, or _________________________________ acting on his behalf, (Patient) (Dependent/Representative) request to receive anesthesia for my pending procedure/operation/treatment.   A 6. OBSTETRIC PATIENTS: Specific risks/consequences of spinal/epidural anesthesia may include itching, low blood pressure, difficulty urinating, slowing of the baby's heart rate and headache.  Rare risks include infections, high spinal block, spinal bleeding ___________________________________________________           _____________________________________________________  Date/Time                                                                                               Responsible person in case of minor

## (undated) NOTE — LETTER
4/20/2021          To Whom It May Concern:    Aracely Slade is currently under my medical care and may not return to work at this time. Re-evaluate in 1-2 weeks. If you require additional information please contact our office.         Sincerely,    [employer]

## (undated) NOTE — LETTER
3/4/2022          To Whom It May Concern:    Jamie Gan is currently under my medical care and was seen today at the clinic. He may return to work on 03/05/2022. Activity is restricted as follows: none. If you require additional information please contact our office.         Sincerely,        Miguel Foster, DO

## (undated) NOTE — LETTER
Date & Time: 11/24/2023, 9:19 AM  Patient: Perla Bae  Encounter Provider(s):    GUEVARA Henry       To Whom It May Concern:    Loretta Armendariz was seen and treated in our department on 11/24/2023. He can return to work.     If you have any questions or concerns, please do not hesitate to call.        _____________________________  Physician/APC Signature

## (undated) NOTE — LETTER
Date & Time: 4/5/2024, 2:45 PM  Patient: Vadim Cantrell  Encounter Provider(s):    Thony Daily MD       To Whom It May Concern:    Vadim Cantrell was seen and treated in our department on 4/4/2024. He should not return to work until 04/08/24 .    If you have any questions or concerns, please do not hesitate to call.        _____________________________  Physician/APC Signature

## (undated) NOTE — LETTER
1/8/2019          To Whom It May Concern:    Hiam Lorenzo is currently under my medical care and may not return to work at this time. Please excuse Carmela Lopez for 1 days 01/08/19. He was totally incapacitated and unable to work.   He may return to work o

## (undated) NOTE — LETTER
24          Vadim Cantrell  :  1970      To Whom It May Concern:    This patient was seen in our office on 24 .  Work status:  May return to work full-time, no restrictions    May return to work status per above effective  2024.    If this office may be of further assistance, please do not hesitate to contact us.      Sincerely,    Krishna Jesus MD

## (undated) NOTE — LETTER
Date & Time: 8/4/2022, 2:38 PM  Patient: Christopher Hughes  Encounter Provider(s):    Elías Cartagena., GUEVARA       To Whom It May Concern:    Jc Manriquez was seen and treated in our department on 8/4/2022. He should be excused from work to return 8/8/22.     If you have any questions or concerns, please do not hesitate to call.        _____________________________  Physician/APC Signature

## (undated) NOTE — LETTER
[facility]  [address], [place], [place]    Authorization for Surgical Operation and Procedure                               I hereby authorize Krishna Jesus MD, my physician and his/her assistants (if applicable), which may include medical students, residents, and/or fellows, to perform the following surgical operation/ procedure and administer such anesthesia as may be determined necessary by my physician: Operation/Procedure name (s) COLONOSCOPY on Vadim Cantrell   2.   I recognize that during the surgical operation/procedure, unforeseen conditions may necessitate additional or different procedures than those listed above.  I, therefore, further authorize and request that the above-named surgeon, assistants, or designees perform such procedures as are, in their judgment, necessary and desirable.    3.   My surgeon/physician has discussed prior to my surgery the potential benefits, risks and side effects of this procedure; the likelihood of achieving goals; and potential problems that might occur during recuperation.  They also discussed reasonable alternatives to the procedure, including risks, benefits, and side effects related to the alternatives and risks related to not receiving this procedure.  I have had all my questions answered and I acknowledge that no guarantee has been made as to the result that may be obtained.    4.   Should the need arise during my operation/procedure, which includes change of level of care prior to discharge, I also consent to the administration of blood and/or blood products.  Further, I understand that despite careful testing and screening of blood or blood products by collecting agencies, I may still be subject to ill effects as a result of receiving a blood transfusion and/or blood products.  The following are some, but not all, of the potential risks that can occur: fever and allergic reactions, hemolytic reactions, transmission of diseases such as  Hepatitis, AIDS and Cytomegalovirus (CMV) and fluid overload.  In the event that I wish to have an autologous transfusion of my own blood, or a directed donor transfusion, I will discuss this with my physician.  Check only if Refusing Blood or Blood Products  I understand refusal of blood or blood products as deemed necessary by my physician may have serious consequences to my condition to include possible death. I hereby assume responsibility for my refusal and release the hospital, its personnel, and my physicians from any responsibility for the consequences of my refusal.    o  Refuse   5.   I authorize the use of any specimen, organs, tissues, body parts or foreign objects that may be removed from my body during the operation/procedure for diagnosis, research or teaching purposes and their subsequent disposal by hospital authorities.  I also authorize the release of specimen test results and/or written reports to my treating physician on the hospital medical staff or other referring or consulting physicians involved in my care, at the discretion of the Pathologist or my treating physician.    6.   I consent to the photographing or videotaping of the operations or procedures to be performed, including appropriate portions of my body for medical, scientific, or educational purposes, provided my identity is not revealed by the pictures or by descriptive texts accompanying them.  If the procedure has been photographed/videotaped, the surgeon will obtain the original picture, image, videotape or CD.  The hospital will not be responsible for storage, release or maintenance of the picture, image, tape or CD.    7.   I consent to the presence of a  or observers in the operating room as deemed necessary by my physician or their designees.    8.   I recognize that in the event my procedure results in extended X-Ray/fluoroscopy time, I may develop a skin reaction.    9. If I have a Do Not Attempt  Resuscitation (DNAR) order in place, that status will be suspended while in the operating room, procedural suite, and during the recovery period unless otherwise explicitly stated by me (or a person authorized to consent on my behalf). The surgeon or my attending physician will determine when the applicable recovery period ends for purposes of reinstating the DNAR order.  10. Patients having a sterilization procedure: I understand that if the procedure is successful the results will be permanent and it will therefore be impossible for me to inseminate, conceive, or bear children.  I also understand that the procedure is intended to result in sterility, although the result has not been guaranteed.   11. I acknowledge that my physician has explained sedation/analgesia administration to me including the risk and benefits I consent to the administration of sedation/analgesia as may be necessary or desirable in the judgment of my physician.    I CERTIFY THAT I HAVE READ AND FULLY UNDERSTAND THE ABOVE CONSENT TO OPERATION and/or OTHER PROCEDURE.     ____________________________________  _________________________________        ______________________________  Signature of Patient    Signature of Responsible Person                Printed Name of Responsible Person                                      ____________________________________  _____________________________                ________________________________  Signature of Witness        Date  Time         Relationship to Patient    STATEMENT OF PHYSICIAN My signature below affirms that prior to the time of the procedure; I have explained to the patient and/or his/her legal representative, the risks and benefits involved in the proposed treatment and any reasonable alternative to the proposed treatment. I have also explained the risks and benefits involved in refusal of the proposed treatment and alternatives to the proposed treatment and have answered the patient's  questions. If I have a significant financial interest in a co-management agreement or a significant financial interest in any product or implant, or other significant relationship used in this procedure/surgery, I have disclosed this and had a discussion with my patient.     _____________________________________________________              _____________________________  (Signature of Physician)                                                                                         (Date)                                   (Time)  Patient Name: Vadim Cantrell      : 1970      Printed: 2024     Medical Record #: [id number]                                      Page 1 of 1

## (undated) NOTE — LETTER
9/20/2021      REGARDING:        Janinelysbrannon         To whom it may concern regarding the above named:    I am the primary care provider for Mr. Raven Branham.   He has been under my care and he has at

## (undated) NOTE — LETTER
11/19/2019          To Whom It May Concern:    Viviana Hebert is currently under my medical care and may not return to work at this time. Please excuse Sherif Cobos for 1 days beginning 11/19/2019. He may return to work on 11/20/2019.   Activity is restrict

## (undated) NOTE — LETTER
4/9/2024      REGARDING:        Vadim Cantrell        [address] [facility] [address]         To whom it may concern regarding the above-named employee/patient:    I am the primary care provider for Vadim Cantrell.  Please be advised that he is still under my care for upper respiratory, infectious disorder that has contagious potential.  Please excuse him for being off work beginning Tuesday, April 9, 2024, with an expected return to work on Monday, April 15, 2024.  This return date is subject to the patient's progress.  Right now we anticipate that he should be well and no longer incapacitated which he is now.  His status is totally incapacitated and he is not able to perform the tasks associated with his job or any light duty activity.  He will also be safe around his coworkers and not contagious.    Feel free to call if you have any questions.      Sincerely,    Hao Baez DO  [address] [address]  [phone]        Document electronically generated by:  Hao Baez DO

## (undated) NOTE — LETTER
2/6/2020          To Whom It May Concern:    Denton Dandy is currently under my medical care and may not return to work at this time. Please excuse Shannon Diaz for 1 days on February 6, 2020. He may return to work on 02/07/2020.   Activity is restricted